# Patient Record
Sex: FEMALE | Race: WHITE | NOT HISPANIC OR LATINO | Employment: OTHER | URBAN - METROPOLITAN AREA
[De-identification: names, ages, dates, MRNs, and addresses within clinical notes are randomized per-mention and may not be internally consistent; named-entity substitution may affect disease eponyms.]

---

## 2023-12-07 DIAGNOSIS — R42 DIZZINESS: Primary | ICD-10-CM

## 2023-12-07 RX ORDER — NADOLOL 40 MG/1
40 TABLET ORAL 2 TIMES DAILY
COMMUNITY
Start: 2013-03-10 | End: 2023-12-07 | Stop reason: SDUPTHER

## 2023-12-07 RX ORDER — NADOLOL 40 MG/1
40 TABLET ORAL 2 TIMES DAILY
Qty: 60 TABLET | Refills: 3 | Status: SHIPPED | OUTPATIENT
Start: 2023-12-07 | End: 2024-02-14 | Stop reason: DRUGHIGH

## 2023-12-07 NOTE — TELEPHONE ENCOUNTER
Received message from pharmacy that script was transferred to them, all refills have been dispensed and they are in need of a new script for pt. Pt was last seen 3/28/23 with instructions to F/U in 6-12 months. Pt does not have F/U becky scheduled at this time.

## 2024-02-14 ENCOUNTER — TELEMEDICINE (OUTPATIENT)
Dept: NEUROLOGY | Facility: CLINIC | Age: 59
End: 2024-02-14
Payer: MEDICARE

## 2024-02-14 DIAGNOSIS — G90.A POSTURAL ORTHOSTATIC TACHYCARDIA SYNDROME: ICD-10-CM

## 2024-02-14 DIAGNOSIS — G43.719 INTRACTABLE CHRONIC MIGRAINE WITHOUT AURA AND WITHOUT STATUS MIGRAINOSUS: Primary | ICD-10-CM

## 2024-02-14 PROCEDURE — 99214 OFFICE O/P EST MOD 30 MIN: CPT | Performed by: NURSE PRACTITIONER

## 2024-02-14 RX ORDER — ELETRIPTAN HYDROBROMIDE 40 MG/1
40 TABLET, FILM COATED ORAL AS NEEDED
COMMUNITY
Start: 2010-10-28 | End: 2024-02-14 | Stop reason: SDUPTHER

## 2024-02-14 RX ORDER — ONDANSETRON 8 MG/1
8 TABLET, ORALLY DISINTEGRATING ORAL EVERY 8 HOURS PRN
COMMUNITY
Start: 2016-07-06 | End: 2024-02-14 | Stop reason: SDUPTHER

## 2024-02-14 RX ORDER — ONDANSETRON 8 MG/1
8 TABLET, ORALLY DISINTEGRATING ORAL EVERY 8 HOURS PRN
Qty: 20 TABLET | Refills: 5 | Status: SHIPPED | OUTPATIENT
Start: 2024-02-14

## 2024-02-14 RX ORDER — NADOLOL 80 MG/1
80 TABLET ORAL 2 TIMES DAILY
Qty: 60 TABLET | Refills: 1 | Status: SHIPPED | OUTPATIENT
Start: 2024-02-14 | End: 2024-04-26

## 2024-02-14 RX ORDER — ELETRIPTAN HYDROBROMIDE 40 MG/1
40 TABLET, FILM COATED ORAL AS NEEDED
Qty: 6 TABLET | Refills: 5 | Status: SHIPPED | OUTPATIENT
Start: 2024-02-14

## 2024-02-14 RX ORDER — TIZANIDINE 4 MG/1
4 TABLET ORAL 3 TIMES DAILY PRN
COMMUNITY
Start: 2019-03-09 | End: 2024-02-14 | Stop reason: SDUPTHER

## 2024-02-14 RX ORDER — TIZANIDINE 4 MG/1
4 TABLET ORAL 3 TIMES DAILY PRN
Qty: 90 TABLET | Refills: 1 | Status: SHIPPED | OUTPATIENT
Start: 2024-02-14

## 2024-02-14 NOTE — PROGRESS NOTES
Patient being assessed today for follow-up of migraine and POTS.  Patient has been having increased migraine activity over the last several months.  She does utilize the Relpax which will at least relieve and ease up some of the pain even if it does not completely abort it, she is able to manage the pain better.  She has also had 3 episodes of syncope.  Would like to increase her nadolol titrating up to 80 mg twice daily.  Verbally explained that to patient and spouse who both verbalized understanding.  We will continue the Relpax, tizanidine, Zofran as she has been taking them.  Patient has pending hip replacement surgery.  Patient also reporting that she has had some significant challenges with her memory.  She did follow with her PCP who ordered neurocognitive evaluation.  Discussed role of medicine, importance of taking medications, potential risks, benefits, and precautions to be taken.  Reviewed sleep hygiene and dietary modifications.  Follow-up in 4 to 6 weeks.    This note was created with voice recognition software and was not corrected for typographical or grammatical errors

## 2024-02-21 ENCOUNTER — HOSPITAL ENCOUNTER (OUTPATIENT)
Facility: HOSPITAL | Age: 59
Setting detail: OUTPATIENT SURGERY
End: 2024-02-21
Attending: STUDENT IN AN ORGANIZED HEALTH CARE EDUCATION/TRAINING PROGRAM | Admitting: STUDENT IN AN ORGANIZED HEALTH CARE EDUCATION/TRAINING PROGRAM
Payer: MEDICARE

## 2024-02-27 ENCOUNTER — OFFICE VISIT (OUTPATIENT)
Dept: PSYCHOLOGY | Facility: CLINIC | Age: 59
End: 2024-02-27
Payer: MEDICARE

## 2024-02-27 DIAGNOSIS — R41.9 COGNITIVE COMPLAINTS: ICD-10-CM

## 2024-02-27 DIAGNOSIS — R41.3 MEMORY DIFFICULTY: Primary | ICD-10-CM

## 2024-02-27 PROCEDURE — 99211NT NEUROPYSCH TESTING PENDING FINAL BILLING: Performed by: PSYCHOLOGIST

## 2024-02-27 NOTE — PROGRESS NOTES
Neuropsychology Evaluation    Name: Yumi Tai  : 1965  MRN: [id number]  Referring Provider: Lisandra Mccoy MD  Date of Service: 2024    Reason for Visit: Ms. Tai was referred for neuropsychological evaluation due to memory difficulty and cognitive complaints.    In the context of presumed average premorbid intellectual functioning, present results demonstrated largely intact cognitive abilities in all domains tested (i.e., simple auditory attention, working memory, processing speed, visuospatial abilities, language, learning and memory, executive functioning), with relative difficulty limited to tests involving verbal speed (i.e., verbal fluency, speeded word reading/color naming). Fine motor dexterity was largely within normal limits bilaterally (slightly worse on the L, though not a significant discrepancy), and there was no other indication of lateralized dysfunction. Most notably, Ms. Tai indicated severely elevated depressive and anxiety symptoms on self-report measures, consistent with the significant distress described on interview.     DIAGNOSTIC IMPRESSIONS: History and present results do not indicate a dementia or even lesser form of cognitive impairment.     Relative difficulty on select tests involving verbal speed, in the context of an otherwise normal cognitive profile (including average performances on other measures of language and processing speed), does not warrant a cognitive diagnosis at this time. Rather, this profile most likely reflects longstanding weaknesses (e.g., consistent with past diagnosis of reading disorder), as well as potential interference from psychological factors (e.g., the considerable anxiety observed during this evaluation) on test performance. Ms. Tai's reported difficulties with memory were not apparent on testing; in fact, learning and memory were relative strengths.     In her daily life, stress, depression, anxiety, experience  of pain/somatic discomfort, lack of restorative sleep, and daytime fatigue are likely contributing to her experience of cognitive difficulty, and the overall clinical picture suggests there is likely a functional component to symptoms. Even minimally, these factors may intermittently reduce cognitive effectiveness, typically by depleting cognitive/mental resources and interfering with normal attention and efficiency of information processing (e.g., via intrusive negative, self-critical, and/or anxious thoughts). With attention disturbance, memories are poor due to a failure to encode information versus a loss of learned material. Stress has also been shown to impair the retrieval of stored information. In particular, Ms. Tai's description of cognitive difficulties in daily life seem like lapses in attention and diminished cognitive effectiveness. Psychological/constitutional factors can also compromise other abilities (e.g., performing complex detail-oriented tasks, attending to multiple tasks at once, acquiring new information, and organizing thoughts/speech) and hinder the ability to adapt to, or develop compensatory strategies for, any subtle cognitive weaknesses/changes that may be present.     RECOMMENDATIONS & CONSIDERATIONS:    1.   Ms. Tai can be assured that her cognitive functioning is largely normal compared to same-aged peers. Importantly, learning/memory performances were consistently within normal limits, indicating that she does not currently have a fundamental memory retention problem. Rather, stress, anxiety, depression, fluctuating mood, experience of chronic pain, lack of refreshing/non-restorative sleep, and daytime fatigue are likely interfering with the ability to effectively encode and retrieve information in her day-to-day life.     Ms. Tai should continue to follow up with Neurology for optimal management of migraines and POTS.     2.   The severity of Ms. Tai's  reported depression, anxiety, and stress reaction suggests that psychological symptoms are not optimally managed at present; aggressively targeting these factors would be prudent.    Ms. Tai would likely benefit from consultation with a psychiatrist to better assist with symptom management. Dr. Loren Mullins, Dr. Joyce Leo, Dr. Mayur Oconnor, Dr. Kathia Reyez, or one of their colleagues in Psychiatry ([phone]) can assist with optimizing pharmacological management of emotional factors that might be exacerbating cognitive difficulties and/or otherwise interfering with everyday functioning. [place]. Farheen Mcginnis [place] [place] [phone][place] and Dr. Lin Weir MD ([phone]) are both highly recommended.    Ms. Tai would likely also benefit from individual psychotherapy / adjustment counseling, particularly brief structured cognitive behavioral therapy (CBT) or acceptance and commitment therapy (ACT), to learn effective coping skills and strategies for managing stress, mood, and physical symptoms that interfere with her daily functioning. Alternatively, given her emphasis on somatic and cognitive symptoms, she may consider adjustment counseling with a health psychologist. Health psychologists specialize in the interaction between physical wellbeing and mental health, and may be particularly well-equipped to assist Ms. Tai in learning adaptive strategies of coping with physical symptoms/difficulties and other life stressors that can affect coping ability.    3.   Restorative sleep (7-9 hours/night recommended for adults) is critical for daytime alertness/safety, cognitive effectiveness, and mood, as well as interpersonal relationships and physical, neurological, and mental health more broadly. Ms. Tai described a degree of sleep disturbance (related, at least partly, to psychiatric factors), with diagnosis of insomnia, and daytime  fatigue; if difficulties persist or worsen after better management of psychological symptoms, she is encouraged to continue working with Sleep Medicine and her other providers to optimize sleep. Along these lines, she may benefit from Cognitive Behavioral Therapy for Insomnia (CBT-I), as recommended by Sleep Medicine. Dr. Jessie Canela ([phone]) specializes in behavioral interventions that can enhance sleep and could be helpful in in this regard.    More broadly, the following behavioral strategies and environmental modifications can help optimize sleep duration and quality:            a.  Set a fixed bedtime and waking time every day.            b.  Avoid napping during the day.             c.  Avoid alcohol, caffeine and heavy, spicy, or sugary foods 4-6 hours before bedtime.             d.  Exercise regularly, but not right before going to bed.            e.  Block out all distractions by turning off - or even removing from the room - electronic devices such as TV, computer, phone, video player, audio player, mikayla device, etc.            f.  Use comfortable bedding, set a comfortable temperature, and keep the room well ventilated.            g.  Do not use the bed as an office, workroom, or recreation room.             h.  Establish a pre-sleep ritual, such as a warm bath or a few minutes of reading.            i.  If prone to anxiety, relaxation techniques such as yoga and deep breathing can be helpful.     4.   In general, information is more likely to be reliably remembered when efforts are made to explicitly enhance initial learning, such as by rehearsing, paraphrasing, and restating information; and by identifying links between to-be learned information and prior knowledge.     Structural and organizational reinforcements are often helpful for individuals with memory complaints. As such, Ms. Tai may benefit from the following strategies:            a.  Following a routine and consistent daily  schedule.             b.  Using a single planner, calendar, or electronic organizer to plan and manage appointment dates, activities, and tasks.            c.  Working on one task at a time until it is completed.            d.  Keeping a single list of tasks, listed by priority, and marking them when complete.            e.  Placing reminders in places where they are easily noticed (e.g., a note on door).            f.  Using a mobile phone or electronic calendar to set timers for tasks and events (e.g., medications, appointments).            g.  Writing down important information (e.g., tasks, conversational details, list items) immediately, to strengthen encoding and for later reference. This will also help to free up cognitive resources to focus on the task at hand.            h.  Associating new information with older, previously stored information (e.g., familiar categories, anecdotes, references, reminders).            i.  Using recognition cue cards in everyday memory situations that come up with frequency, such as lists of routine household tasks, errands, or grocery items to prompt recall.            j.   Having specific locations for frequently used items (e.g., keys, wallet, phone).    5.   Ms. Tai is encouraged to remain in close contact with medical professionals regarding her cerebrovascular risk factors (e.g., hyperlipidemia, diabetes, obesity), POTS, and hypothyroidism, as these conditions may affect current and future brain function.    The following activities and interventions are recommended for optimizing brain health and preserving cognitive function:            a.  “heart-healthy”/cardiovascular diet;            b.  good stress management (e.g., relaxation techniques);            c.  management of any vascular risks (e.g., hypertension, cholesterol);            d.  30-60 minutes of daily aerobic exercise (e.g., walking, swimming);            e.  social engagement (e.g., getting together  "with other people);             f.  adequate sleep; and             g.  cognitively stimulating activities (e.g., classes to learn new things, challenging puzzles).      6.   A planned re-evaluation does not appear necessary at this time. If concerns persist or worsen after psychological factors are adequately managed, updated neuropsychological evaluation, as clinically indicated (no sooner than 1-2 years), using present data as baseline, may be helpful in determining if there is cognitive change over time. Re-evaluation without first addressing the above factors would be of limited benefit, given the degree to which psychological/sleep difficulties likely interfere with her functional status.      Thank you for the opportunity to participate in Ms. Tai's evaluation and care. If we can be of further assistance, please do not hesitate to contact us at [phone].      -- EXTENDED REPORT --    History of Presenting Illness: Ms. Tai is a 58-year-old, right-handed,  female, followed by [facility] for neurological management of chronic migraines (with limited benefit from medications) and postural orthostatic tachycardia syndrome (POTS). History additionally significant for fibromyalgia, and records also note other somatic concerns, including dizziness and fatigue.    SELECT RELEVANT LABS/EXAMS: CTA of the head and neck (3/29/2012) was read by the radiologist as showing “focal area of stenosis versus artifact [near] the M2/M3 junction of the left middle cerebral artery”; “possible stenosis is also identified at the origin of the right posterior cerebral artery.” MRA was recommended for further evaluation.    MRI of the brain (with/without contrast; 9/25/2014) was read as showing \"no evidence of acute infarction or other acute pathology” and “probable remote right pontine ischemic focus.\" MRV of the head was “unremarkable, without evidence of thrombosis.”    MRA of the head " "(9/25/2014) was read as \"unremarkable, without evidence of thrombosis.\"    Ms. Tai is unemployed, with Social Security disability status (related to migraines and fibromyalgia) since 2006. Regarding instrumental activities of daily living (IADLs):      - Medication management: Independent, using a pill organizer. Ms. Tai acknowledged occasional difficulty with insulin management (e.g., unsure if she already administered a dose, or if dosing was correct); she was previously regimented in documenting this information until 2020, after initiation of subcutaneous pump.   - Finances / bill paying:  assumed responsibility in 2006 (after the patient's pulmonary embolism), due to her anxiety in the context of financial constraints/stress and her unemployment at the time.    - Scheduling / appointments: Independent (with routine use of a calendar), without reported problems/difficulty.   - Driving: Limited by increased anxiety; denied any recent accidents/near misses, serious traffic violations, navigation difficulty, or instances of getting lost.    - Cooking / meal preparation: Usually done together with , without significant difficulty (aside from physical limitations; uses lists for more involved meals). No safety incidents or concerns.   - Household tasks: Completes light household chores without difficulty; otherwise, limited by physical difficulty/somatic symptoms.   - Ms. Tai is fully independent with more basic ADLs (e.g., bathing, toileting, hygiene, dressing, eating) but described diminished self-care/hygiene (e.g., reduced motivation to shower) due to physical difficulties and fatigue.     Relevant Medical Status & History:    - Sleep: Typically in bed around 8:30-9:30PM (with occasional difficulty falling asleep, despite nightly use of medical marijuana). Sleep is often restless, and Ms. Tai wakes at least 1x/night, without difficulty returning to sleep. She does not " feel particularly well-rested in the morning and reported significant daytime fatigue (“tired all the time”), with tendency to nap/doze. Past sleep study was indicative of insomnia.    - Physical activity/exercise: Playing with the grandchildren.   - Alcohol use: Rare/minimal (i.e., an occasional spritzer); no reported history of particularly heavy or problematic alcohol use.   - Tobacco: Denied past and present use.   - Other substances: Medical marijuana card since May 2023, with nightly use of 10mg edible for sleep/anxiety and occasional use of 2.5mg for social anxiety.   - Caffeine: None/minimal.     - Stroke/TIA: Denied; the patient reported an incident in ~2011/2012 (no ED was found in the medical record) when she had a migraine with facial numbness and slurred speech, prompting admittance to the ED; a TIA was reportedly suspected but symptoms were attributed to complex migraine (records were not available for review).    - Seizures: Denied.    - Head injury/concussion (with loss of consciousness): Denied.    - POTS: Records indicate autonomic and tilt table test was completed in 2016 with Dr. Forbes showed prominent orthostatic intolerance with compensatory tachycardia (record not available for review), prompting diagnosis of POTS. Records from Dr. Hicks of Cardiology (see note dated 4/6/2021) indicate no postural symptoms and anxiety was noted to be a confounding factor. During interview, she reported episodes of dizziness/syncope ~4x/year; currently followed by [facility].    - Headaches/Migraines: Longstanding history of subtle headaches (typically around menses) since childhood. Worsened with onset of migraines post-hysterectomy in 2008. She denied noted being trialed on several different medication (with minimal benefit; followed by [facility]), noting having 9 migraines and 14 headaches over the course of the month of January.   - Cardiology: EKG completed on 03/22/2021 was read as showing  "\"left ventricular systolic function is normal with a 60-65% estimated ejection fraction\" and \"spectral Doppler shows an impaired relaxation pattern of left ventricular diastolic filling.\" She was briefly followed by Dr. Hicks of  Cardiology (last appointment dated 04/06/2021); hypertension is currently managed with medications, monitoring by PCP.     - [place] Palsy: Onset in teens; resolved within 1 year without known treatment.    - Motor/gait: Reduced in the context of fibromyalgia. Ambulates with the use of a cane/walker.    - Metabolic: Adult onset diagnosed in 2006; managed with insulin pump; followed by Dr. Ruvalcaba.    - Endocrine: Lichen planus (vagus) and is prone to frequent UTIs. Urinary/bowel incontinence was also noted.    - Pain: Back and knees; typically 5/10 (with 10 being most severe); reportedly 3/10 during the current evaluation.     Patient Active Problem List   Diagnosis    Headache, chronic migraine without aura, intractable    Postural orthostatic tachycardia syndrome     Past Medical History:   Diagnosis Date    Cervicalgia 04/09/2014    Cervicalgia    Generalized hyperhidrosis 04/27/2015    Generalized hyperhidrosis    Other disturbances of skin sensation 04/09/2014    Burning sensation    Personal history of pulmonary embolism     History of pulmonary embolism     Psychiatric Status & History: Ms. Tai reported longstanding mild depression (onset during childhood/adolescence, in the context of difficult upbringing; e.g., father passed away when she was 2 years old). Depression and anxiety were noted to worsen in the context of her first marriage, her son's  deployment in 2005, and onset of health-related issues in 2006; symptoms have worsened significant over the past year. She described a pattern of racing thoughts/rumination when waking in the morning and feeling compelled to complete tasks on her lists. Increased isolation, reduced social engagement, and anhedonia was " also described. She endorsed passive thoughts about death but denied any history/current plan or means to act; protective factors included family.    - Treatment history: Engaged in marital therapy in ~2001. Reportedly diagnosed and treated for PTSD in the context of her son's  service/deployment and [facility] (without perceived benefit). No history of psychiatric consultation; psychiatric medication (Xanax) is managed by PCP. She is not currently engaged in therapy/counseling.   - Recent mood:  On a scale from 0 (none) to 10 (most severe), recent depression was rated a 10; anxiety was rated 10.    - Recent stressors: Health-related concerns; migraines/headaches.    - Hobbies: Previously enjoying crafting but stopped due to physical pain/fatigue.    - Social support: Primarily  and sister.     Developmental/Educational/Psychosocial History:    - Early development: Reportedly normal; no known birth complications or developmental delays.   - Academic history: Noted potentially repeating 1st or 2nd grade due to the families frequently relocating to different states/schools. Reported a history of learning disorder (reading) diagnosed in middle school and required specialized courses throughout high school.   - Educational attainment: 12 years.   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    - Social history: This is Ms. Tai's second marriage (previously ); she has 3 children from her first marriage and 8 grandchildren.   - Currently lives with:   (whom she has known since age 6).    Relevant Family History: Migraines (sister, mother, niece); Parkinson's disease (maternal aunt). Family medical history largely unknown for paternal side.     Procedures/Tests:  Review of available records;  Adult Neuropsychology History Form; Clinical interview with Ms. Tai and her  conjointly;  Jacobsen Anxiety Inventory (ARABELLA)  Jacobsen Depression Inventory (BDI)  Brief Visuospatial Memory Test-Revised (BVMT-R)  California Verbal Learning Test-Second Edition (CVLT-II) - Short Form   Bonnie-Lee Executive Function System (D-KEFS) Verbal Fluency  Dot Counting Test (DCT)  Grooved Pegboard Test  Neuropsychological Assessment Battery (NAB) Naming Test  Stroop Color and Word Test (Stroop)  Trail Making Test (TMT)  Wechsler Adult Intelligence Scale-Fourth Edition (WAIS-IV) Digit Span, Coding, and Matrix Reasoning subtests  Wechsler Test of Adult Reading (WTAR)    The purpose of this evaluation was reviewed, as were issues related to confidentiality and options for receiving feedback regarding results. All questions were answered; Ms. Tai verbalized understanding and agreed to proceed with this evaluation.    Note: All testing was administered by a psychometrist; results were interpreted in the context of the appropriate normative data. Additional tests were planned, but not administered, due to Ms. Tai's apparent level of anxiety during the evaluation (and its potential impact on test performance/task engagement).    MENTAL STATUS & BEHAVIORAL EXAM: Ms. Tai arrived for the appointment on time and accompanied by her , Misha. She was casually dressed and appropriately groomed. She ambulated with a cane; gait was slow and mildly unsteady. There were no apparent involuntary motor movements. Ms. Tai was alert and fully oriented to self, situation, place, and time. She wore glasses; vision was reportedly adequate for testing. Hearing and receptive language appeared intact on informal observation. Conversational speech was generally fluent and normal in rate, prosody, and volume, with sporadic/inconsistent stutter (i.e., during tests requiring speeded verbal output; on interview, when  discussing her difficulties with verbal communication). There were no notable language abnormalities. Ms. Tai was open and responsive to questions, detailed recall of autobiographical and clinical information/events; she often looked to her  for assistance/reassurance, though her recall seemed more accurate (he generally deferred to her if there was a discrepancy between their reports). Organizing her thoughts appeared somewhat effortful, though expressed thoughts were logical and goal-directed. Affect was full and somewhat dysphoric, with significant apparent anxiety and increasing emotional distress as the evaluation progressed. At one point, she reported feeling dizzy, which improved with a snack/break. Nevertheless, Ms. Tai was cooperative in completing evaluation procedures and appeared to give her best effort on tasks.     Results/Data:       Descriptor T-Score Standard Score Z-Score Scaled Score %ile Rank   Very Superior > 70 > 130 > 2.00  > 16 > 98   Superior 63-69 120-129 1.34 to 1.99 14-15 91-97   High Average 57-62 110-119 0.67 to 1.33 12-13 75-90   Average 43-56  -0.66 to 0.66 8-11 25-74   Low Average 37-42 80-89 -1.33 to -0.67 6-7 9-24   Borderline 33-36 75-79 -1.67 to -1.34 5 5-8   Mildly Deficient 30-32 70-74 -2.00 to -1.68 4 2-4   Moderately Deficient 25-29 62-69  -2.53 to -2.01 3 1   Severely Deficient <24 <61 <-2.54 1-2 <1      Performance Validity: Results of stand-alone and embedded measures of performance validity were \within valid ranges.     Premorbid Functioning: Based on a measure of sight-reading ability, premorbid intellectual functioning was estimated to be in the average range (estimated FSIQ = 95). On a different (non-verbal) measure shown to be a good indicator of premorbid abilities, performance was average.    Attention & Working Memory: Span of auditory attention/working memory, measured by repetition of digits, was average overall, with average digits  forward, backward, and in sequence.    Processing Speed & Executive Abilities: Efficiency in matching and reproducing symbols associated with numbers was average. Speeded word reading was severely deficient, and color naming was mildly deficient; color naming of incongruent color words (e.g., blue ink spelling the word “red”) was low average.    Performance on a timed task that requires simultaneous visual scanning, number sequencing, and fine-motor coordination was average, with 0 errors. When the task became more complex, requiring alternating attention to sequencing numbers and letters, performance was average, with 0 errors. A comparison of scores on the two tasks did not suggest a problem with shifting of mental set.    Learning & Memory: Learning and recall of a 9-item word list over five trials was low average, with a strong/above-average learning slope (trial scores: 4, 6, 8, 8). Use of semantic clustering strategy during list learning was high average. After a short delay, Ms. Tai was able to recall all 9 list words (very superior performance). After a longer, free recall was average (7 list words); cued recall was average (8 list words). Delayed recognition was average (8/9), with 0 false-positive errors; ability to distinguish list words from non-list foils was average.     Regarding visuospatial memory, Ms. Sarkars ability to learn and recall an array of six geometric designs over three trials was in the high-average range, with an average learning curve. Later recall was high average (100% of best learning trial), and recognition of the designs was without error.     Language Functioning: Naming of common objects was average (31/31). Letter fluency (i.e., rapid generation of words beginning with a given letter over three one-minute trials) and category fluency (i.e., rapid generation of words in a specific category) were low average. Category fluency while switching back and forth between  two categories was average. Across verbal fluency tasks, number of repetitions and set-loss errors were within normal limits.     Visuospatial Functioning: Ability to copy an array of two-dimensional geometric designs was intact.    Motor Functioning:  Fine motor dexterity, measured by rapid placement of grooved pegs, was average for the dominant (right) hand and low average for the non-dominant (left).     Emotional Functioning: On face-valid self-report inventories assessing mood and emotional status, Ms. Tai's responses suggested severe depression and severe anxiety.      Electronically signed by Samara Kinsey PsyD at 7/21/2024    6:21 PM      Attestation: I saw and evaluated the patient. I personally obtained the key and critical portions of the neuropsychological exam or was physically present for key and critical portions performed by the fellow. I consulted with the fellow, reviewed/revised all documentation, and agree with the diagnostic impressions and neuropsychological findings.

## 2024-02-28 RX ORDER — DULOXETIN HYDROCHLORIDE 60 MG/1
60 CAPSULE, DELAYED RELEASE ORAL DAILY
COMMUNITY

## 2024-02-28 RX ORDER — METFORMIN HYDROCHLORIDE 500 MG/1
1000 TABLET, EXTENDED RELEASE ORAL 2 TIMES DAILY
COMMUNITY
Start: 2023-10-15

## 2024-02-28 RX ORDER — FENOFIBRATE 145 MG/1
145 TABLET, FILM COATED ORAL DAILY
COMMUNITY
Start: 2020-04-02

## 2024-02-28 RX ORDER — HUMAN INSULIN 100 [IU]/ML
10 INJECTION, SUSPENSION SUBCUTANEOUS
COMMUNITY
Start: 2013-03-22

## 2024-02-28 RX ORDER — ERGOCALCIFEROL 1.25 MG/1
50000 CAPSULE ORAL
COMMUNITY

## 2024-02-28 RX ORDER — ONDANSETRON HYDROCHLORIDE 8 MG/1
8 TABLET, FILM COATED ORAL ONCE
COMMUNITY

## 2024-02-28 RX ORDER — ATORVASTATIN CALCIUM 10 MG/1
10 TABLET, FILM COATED ORAL DAILY
COMMUNITY
Start: 2020-04-02

## 2024-02-28 RX ORDER — VIT C/E/ZN/COPPR/LUTEIN/ZEAXAN 250MG-90MG
1200 CAPSULE ORAL DAILY
COMMUNITY

## 2024-02-28 RX ORDER — ALPRAZOLAM 1 MG/1
1 TABLET ORAL NIGHTLY PRN
COMMUNITY

## 2024-02-28 RX ORDER — VALACYCLOVIR HYDROCHLORIDE 500 MG/1
500 TABLET, FILM COATED ORAL DAILY
COMMUNITY
Start: 2021-08-16

## 2024-02-28 RX ORDER — LEVOTHYROXINE SODIUM 50 UG/1
50 CAPSULE ORAL DAILY
COMMUNITY

## 2024-02-28 RX ORDER — GABAPENTIN 300 MG/1
300 CAPSULE ORAL 2 TIMES DAILY
COMMUNITY
Start: 2015-04-14

## 2024-03-13 ENCOUNTER — OFFICE VISIT (OUTPATIENT)
Dept: PSYCHOLOGY | Facility: CLINIC | Age: 59
End: 2024-03-13
Payer: MEDICARE

## 2024-03-13 DIAGNOSIS — R41.9 COGNITIVE COMPLAINTS WITH NORMAL NEUROPSYCHOLOGICAL EXAM: ICD-10-CM

## 2024-03-13 DIAGNOSIS — F54 PSYCHOLOGICAL FACTORS AFFECTING MEDICAL CONDITION: ICD-10-CM

## 2024-03-13 DIAGNOSIS — F43.0 STRESS REACTION: ICD-10-CM

## 2024-03-13 DIAGNOSIS — F41.9 ANXIETY: ICD-10-CM

## 2024-03-13 DIAGNOSIS — R41.3 MEMORY DIFFICULTY: Primary | ICD-10-CM

## 2024-03-13 DIAGNOSIS — F32.A DEPRESSION, UNSPECIFIED DEPRESSION TYPE: ICD-10-CM

## 2024-03-13 PROCEDURE — 96132 NRPSYC TST EVAL PHYS/QHP 1ST: CPT | Performed by: PSYCHOLOGIST

## 2024-03-13 PROCEDURE — 96138 PSYCL/NRPSYC TECH 1ST: CPT | Performed by: PSYCHOLOGIST

## 2024-03-13 PROCEDURE — 96116 NUBHVL XM PHYS/QHP 1ST HR: CPT | Performed by: PSYCHOLOGIST

## 2024-03-13 PROCEDURE — 96133 NRPSYC TST EVAL PHYS/QHP EA: CPT | Performed by: PSYCHOLOGIST

## 2024-03-13 PROCEDURE — 96139 PSYCL/NRPSYC TST TECH EA: CPT | Performed by: PSYCHOLOGIST

## 2024-03-13 NOTE — PROGRESS NOTES
Neuropsychology Note    Name: Yumi Tai  : 1965  MRN: [id number]      Date of Service: 2024     Reason For Visit: Ms. Tai underwent neuropsychological evaluation on 2024 due to memory difficulty and cognitive complaints. The full report can be found in the note for that visit.     She returned today for feedback regarding evaluation results.    Summary: Ms. Tai was seen today to discuss findings from her neuropsychological evaluation on 2024; she was accompanied by her .    Results were reviewed and explained, with a thorough discussion of clinical impressions. It was explained that findings are not indicative of cognitive impairment. Rather, the neuropsychological profile (i.e., with relative difficulty limited to select tests involving verbal speed, in the context of otherwise largely average/above-average performances) is most consistent with her reported history of reading disorder (diagnosed in middle school), together with intermittent interference from non-neurological factors (e.g., stress, anxiety, depression, fatigue) on test performance. In fact, the high level of anxiety/emotional distress observed during the evaluation had prompted a decision to pare down the test battery that day, given the likelihood of psychological symptoms influencing results.    Psychoeducation was provided regarding the effect of stress, anxiety, depression, and fatigue/lack of restorative sleep on cognitive effectiveness. Recommendations were discussed, and questions were addressed.    Ms. Tai was actively engaged in the appointment; affect was generally anxious and somewhat dysphoric, with appropriate brightening. She expressed relief that there was no indication of significant cognitive decline/impairment or a neurodegenerative process. She and her  verbalized understanding of findings, impressions, and recommendations.

## 2024-04-25 DIAGNOSIS — G43.719 INTRACTABLE CHRONIC MIGRAINE WITHOUT AURA AND WITHOUT STATUS MIGRAINOSUS: ICD-10-CM

## 2024-04-25 DIAGNOSIS — G90.A POSTURAL ORTHOSTATIC TACHYCARDIA SYNDROME: ICD-10-CM

## 2024-04-26 RX ORDER — NADOLOL 80 MG/1
80 TABLET ORAL 2 TIMES DAILY
Qty: 60 TABLET | Refills: 0 | Status: SHIPPED | OUTPATIENT
Start: 2024-04-26 | End: 2024-05-06 | Stop reason: SDUPTHER

## 2024-04-30 ENCOUNTER — HOSPITAL ENCOUNTER (OUTPATIENT)
Dept: RADIOLOGY | Facility: HOSPITAL | Age: 59
Discharge: HOME | End: 2024-04-30
Payer: MEDICARE

## 2024-04-30 DIAGNOSIS — M16.12 PRIMARY OSTEOARTHRITIS OF LEFT HIP: ICD-10-CM

## 2024-04-30 PROCEDURE — 73700 CT LOWER EXTREMITY W/O DYE: CPT | Mod: LT

## 2024-05-06 ENCOUNTER — APPOINTMENT (OUTPATIENT)
Dept: PREADMISSION TESTING | Facility: HOSPITAL | Age: 59
End: 2024-05-06
Payer: MEDICARE

## 2024-05-06 ENCOUNTER — TELEMEDICINE (OUTPATIENT)
Dept: NEUROLOGY | Facility: CLINIC | Age: 59
End: 2024-05-06
Payer: MEDICARE

## 2024-05-06 DIAGNOSIS — G43.719 INTRACTABLE CHRONIC MIGRAINE WITHOUT AURA AND WITHOUT STATUS MIGRAINOSUS: ICD-10-CM

## 2024-05-06 DIAGNOSIS — G90.A POSTURAL ORTHOSTATIC TACHYCARDIA SYNDROME: ICD-10-CM

## 2024-05-06 PROCEDURE — 99214 OFFICE O/P EST MOD 30 MIN: CPT | Performed by: NURSE PRACTITIONER

## 2024-05-06 RX ORDER — NADOLOL 80 MG/1
80 TABLET ORAL 2 TIMES DAILY
Qty: 180 TABLET | Refills: 1 | Status: SHIPPED | OUTPATIENT
Start: 2024-05-06

## 2024-05-06 RX ORDER — CLONIDINE HYDROCHLORIDE 0.1 MG/1
0.1 TABLET ORAL DAILY
Qty: 30 TABLET | Refills: 1 | Status: SHIPPED | OUTPATIENT
Start: 2024-05-06

## 2024-05-06 NOTE — PROGRESS NOTES
Patient being assessed today for follow-up on migraines and POTS.  She reports that her symptoms have been improved significantly since we adjusted and increased the nadolol dose.  She has only had 3 migraine episodes in a couple of regular headaches since we did that.  She is reporting significant Hydra pheresis and in the past she has been on clonidine which did help that.  Will put her on clonidine 0.1 mg daily to see if that helps.  Continue the nadolol as she has been taking it.  Discussed role of medicine,  importance of taking medications, potential risks, benefits, and precautions to be taken.  Reviewed sleep hygiene and dietary modifications.  Patient to notify office in 4 to 6 weeks of efficacy.  Follow-up in 6 months.    This note was created with voice recognition software and was not corrected for typographical or grammatical errors

## 2024-05-18 ENCOUNTER — OFFICE VISIT (OUTPATIENT)
Dept: URGENT CARE | Facility: CLINIC | Age: 59
End: 2024-05-18
Payer: MEDICARE

## 2024-05-18 VITALS
RESPIRATION RATE: 20 BRPM | HEART RATE: 79 BPM | BODY MASS INDEX: 41.78 KG/M2 | OXYGEN SATURATION: 97 % | TEMPERATURE: 97 F | HEIGHT: 66 IN | SYSTOLIC BLOOD PRESSURE: 158 MMHG | WEIGHT: 260 LBS | DIASTOLIC BLOOD PRESSURE: 91 MMHG

## 2024-05-18 DIAGNOSIS — J40 SINOBRONCHITIS: Primary | ICD-10-CM

## 2024-05-18 DIAGNOSIS — J32.9 SINOBRONCHITIS: Primary | ICD-10-CM

## 2024-05-18 PROCEDURE — 99203 OFFICE O/P NEW LOW 30 MIN: CPT | Performed by: PHYSICIAN ASSISTANT

## 2024-05-18 PROCEDURE — 1036F TOBACCO NON-USER: CPT | Performed by: PHYSICIAN ASSISTANT

## 2024-05-18 RX ORDER — DOXYCYCLINE 100 MG/1
100 CAPSULE ORAL 2 TIMES DAILY
Qty: 20 CAPSULE | Refills: 0 | Status: SHIPPED | OUTPATIENT
Start: 2024-05-18 | End: 2024-05-28

## 2024-05-18 ASSESSMENT — ENCOUNTER SYMPTOMS
EYES NEGATIVE: 1
ALLERGIC/IMMUNOLOGIC NEGATIVE: 1
FEVER: 0
COUGH: 1
CARDIOVASCULAR NEGATIVE: 1
GASTROINTESTINAL NEGATIVE: 1
MUSCULOSKELETAL NEGATIVE: 1
HEMATOLOGIC/LYMPHATIC NEGATIVE: 1
SINUS COMPLAINT: 1
SORE THROAT: 1
NEUROLOGICAL NEGATIVE: 1
SHORTNESS OF BREATH: 0
PSYCHIATRIC NEGATIVE: 1
ENDOCRINE NEGATIVE: 1

## 2024-05-18 ASSESSMENT — PAIN SCALES - GENERAL: PAINLEVEL: 0-NO PAIN

## 2024-05-18 NOTE — PATIENT INSTRUCTIONS
Increase clear fluids  OTC cough med as directed  Pcp follow up this week if not improving or worsening  ER visit anytime 24/7 for acute worsening or changing condition

## 2024-05-18 NOTE — PROGRESS NOTES
"Subjective   Patient ID: Yumi Tai is a 58 y.o. female.      URI  Presenting symptoms: congestion, cough, ear pain and sore throat    Presenting symptoms: no fever    Cough  Associated symptoms include ear pain and a sore throat. Pertinent negatives include no fever or shortness of breath.   Sinus Problem  Associated symptoms: congestion, cough, ear pain and sore throat    Associated symptoms: no fever and no shortness of breath    This is a 58 yr old female here for respiratory sxs. Sore throat, dry cough, clear/green nasal congestion, chest congestion and right ear pain x 1 week. No dyspnea, or fever. Home COVID test negative x 2.     Review of Systems   Constitutional:  Negative for fever.   HENT:  Positive for congestion, ear pain and sore throat.    Eyes: Negative.    Respiratory:  Positive for cough. Negative for shortness of breath.    Cardiovascular: Negative.    Gastrointestinal: Negative.    Endocrine: Negative.    Genitourinary: Negative.    Musculoskeletal: Negative.    Skin: Negative.    Allergic/Immunologic: Negative.    Neurological: Negative.    Hematological: Negative.    Psychiatric/Behavioral: Negative.     All other systems reviewed and are negative.  BP (!) 158/91   Pulse 79   Temp 36.1 °C (97 °F)   Resp 20   Ht 1.676 m (5' 6\")   Wt 118 kg (260 lb)   SpO2 97%   BMI 41.97 kg/m²     Objective   Physical Exam  Vitals and nursing note reviewed.   Constitutional:       Appearance: Normal appearance.   HENT:      Head: Normocephalic and atraumatic.      Mouth/Throat:      Mouth: Mucous membranes are moist.      Pharynx: Oropharynx is clear.   Cardiovascular:      Rate and Rhythm: Normal rate and regular rhythm.   Pulmonary:      Effort: Pulmonary effort is normal.      Breath sounds: Normal breath sounds.   Musculoskeletal:      Cervical back: Neck supple.   Lymphadenopathy:      Cervical: No cervical adenopathy.   Skin:     General: Skin is warm and dry.   Neurological:      General: " No focal deficit present.      Mental Status: She is alert and oriented to person, place, and time.   Psychiatric:         Mood and Affect: Mood normal.         Behavior: Behavior normal.     Assessment:  Sinobronchitis    Plan:  Doxycycline 100 mg bid x 10 days  OTC cough/cold med as directed  Fluids and rest  Pcp follow up this week if not improving or worsening  ER visit anytime 24/7 for acute worsening or changing condition

## 2024-06-18 ENCOUNTER — HOSPITAL ENCOUNTER (OUTPATIENT)
Facility: HOSPITAL | Age: 59
Setting detail: OUTPATIENT SURGERY
End: 2024-06-18
Attending: STUDENT IN AN ORGANIZED HEALTH CARE EDUCATION/TRAINING PROGRAM | Admitting: STUDENT IN AN ORGANIZED HEALTH CARE EDUCATION/TRAINING PROGRAM
Payer: MEDICARE

## 2024-07-22 ENCOUNTER — APPOINTMENT (OUTPATIENT)
Dept: PREADMISSION TESTING | Facility: HOSPITAL | Age: 59
End: 2024-07-22
Payer: MEDICARE

## 2024-09-09 ENCOUNTER — HOSPITAL ENCOUNTER (OUTPATIENT)
Dept: ORTHOPEDIC SURGERY | Age: 59
Discharge: HOME | End: 2024-09-11
Payer: MEDICARE

## 2024-09-09 ENCOUNTER — OFFICE VISIT (OUTPATIENT)
Dept: ORTHOPEDIC SURGERY | Age: 59
End: 2024-09-09
Payer: MEDICARE

## 2024-09-09 VITALS
OXYGEN SATURATION: 96 % | BODY MASS INDEX: 40.18 KG/M2 | HEIGHT: 66 IN | HEART RATE: 77 BPM | TEMPERATURE: 97.3 F | WEIGHT: 250 LBS

## 2024-09-09 DIAGNOSIS — M16.0 PRIMARY OSTEOARTHRITIS OF BOTH HIPS: Primary | ICD-10-CM

## 2024-09-09 DIAGNOSIS — M25.551 BILATERAL HIP PAIN: ICD-10-CM

## 2024-09-09 DIAGNOSIS — M25.552 BILATERAL HIP PAIN: ICD-10-CM

## 2024-09-09 PROCEDURE — 73521 X-RAY EXAM HIPS BI 2 VIEWS: CPT

## 2024-09-09 PROCEDURE — 99205 OFFICE O/P NEW HI 60 MIN: CPT | Performed by: STUDENT IN AN ORGANIZED HEALTH CARE EDUCATION/TRAINING PROGRAM

## 2024-09-09 PROCEDURE — 3017F COLORECTAL CA SCREEN DOC REV: CPT | Performed by: STUDENT IN AN ORGANIZED HEALTH CARE EDUCATION/TRAINING PROGRAM

## 2024-09-09 PROCEDURE — G8417 CALC BMI ABV UP PARAM F/U: HCPCS | Performed by: STUDENT IN AN ORGANIZED HEALTH CARE EDUCATION/TRAINING PROGRAM

## 2024-09-09 PROCEDURE — G8427 DOCREV CUR MEDS BY ELIG CLIN: HCPCS | Performed by: STUDENT IN AN ORGANIZED HEALTH CARE EDUCATION/TRAINING PROGRAM

## 2024-09-09 PROCEDURE — 73521 X-RAY EXAM HIPS BI 2 VIEWS: CPT | Performed by: STUDENT IN AN ORGANIZED HEALTH CARE EDUCATION/TRAINING PROGRAM

## 2024-09-09 PROCEDURE — 1036F TOBACCO NON-USER: CPT | Performed by: STUDENT IN AN ORGANIZED HEALTH CARE EDUCATION/TRAINING PROGRAM

## 2024-09-09 RX ORDER — LIDOCAINE 50 MG/G
1-2 PATCH TOPICAL
COMMUNITY
Start: 2011-09-12

## 2024-09-09 RX ORDER — GABAPENTIN 300 MG/1
CAPSULE ORAL
COMMUNITY

## 2024-09-09 RX ORDER — SEMAGLUTIDE 0.68 MG/ML
INJECTION, SOLUTION SUBCUTANEOUS
COMMUNITY
Start: 2024-08-28

## 2024-09-09 RX ORDER — LEVOTHYROXINE SODIUM 50 UG/1
50 CAPSULE ORAL DAILY
COMMUNITY

## 2024-09-09 RX ORDER — HYDROXYCHLOROQUINE SULFATE 200 MG/1
1 TABLET, FILM COATED ORAL 2 TIMES DAILY
COMMUNITY
Start: 2021-08-16

## 2024-09-09 RX ORDER — ERGOCALCIFEROL 1.25 MG/1
50000 CAPSULE, LIQUID FILLED ORAL WEEKLY
COMMUNITY
Start: 2024-07-25

## 2024-09-09 RX ORDER — ERGOCALCIFEROL 1.25 MG/1
50000 CAPSULE, LIQUID FILLED ORAL
COMMUNITY

## 2024-09-09 RX ORDER — CYCLOBENZAPRINE HCL 10 MG
10 TABLET ORAL 3 TIMES DAILY PRN
COMMUNITY
Start: 2024-08-17 | End: 2024-09-09

## 2024-09-09 RX ORDER — ONDANSETRON 8 MG/1
8 TABLET, FILM COATED ORAL ONCE
COMMUNITY

## 2024-09-09 RX ORDER — AMLODIPINE BESYLATE 5 MG/1
5 TABLET ORAL DAILY
COMMUNITY
Start: 2024-08-11

## 2024-09-09 RX ORDER — DULOXETIN HYDROCHLORIDE 60 MG/1
60 CAPSULE, DELAYED RELEASE ORAL DAILY
COMMUNITY

## 2024-09-09 RX ORDER — OMEGA-3/DHA/EPA/FISH OIL 300-1000MG
1200 CAPSULE ORAL DAILY
COMMUNITY

## 2024-09-09 RX ORDER — FENOFIBRATE 145 MG/1
145 TABLET, COATED ORAL DAILY
COMMUNITY

## 2024-09-09 RX ORDER — METFORMIN HCL 500 MG
1000 TABLET, EXTENDED RELEASE 24 HR ORAL 2 TIMES DAILY
COMMUNITY
Start: 2023-10-15

## 2024-09-09 RX ORDER — VALACYCLOVIR HYDROCHLORIDE 500 MG/1
500 TABLET, FILM COATED ORAL DAILY
COMMUNITY
Start: 2021-08-16

## 2024-09-09 RX ORDER — CETIRIZINE HYDROCHLORIDE 10 MG/1
10 TABLET ORAL DAILY
COMMUNITY

## 2024-09-09 RX ORDER — METOCLOPRAMIDE 10 MG/1
10 TABLET ORAL 3 TIMES DAILY
COMMUNITY
Start: 2011-09-12

## 2024-09-09 RX ORDER — ALPRAZOLAM 1 MG
TABLET ORAL
COMMUNITY

## 2024-09-09 RX ORDER — ELETRIPTAN HYDROBROMIDE 40 MG/1
40 TABLET, FILM COATED ORAL PRN
COMMUNITY
Start: 2010-10-28

## 2024-09-09 RX ORDER — NADOLOL 80 MG/1
80 TABLET ORAL 2 TIMES DAILY
COMMUNITY

## 2024-09-10 DIAGNOSIS — M16.11 PRIMARY OSTEOARTHRITIS OF RIGHT HIP: Primary | ICD-10-CM

## 2024-09-16 ENCOUNTER — PREP FOR PROCEDURE (OUTPATIENT)
Dept: ORTHOPEDIC SURGERY | Age: 59
End: 2024-09-16

## 2024-09-16 DIAGNOSIS — M16.11 PRIMARY OSTEOARTHRITIS OF RIGHT HIP: ICD-10-CM

## 2024-09-17 ENCOUNTER — TELEPHONE (OUTPATIENT)
Dept: ORTHOPEDIC SURGERY | Age: 59
End: 2024-09-17

## 2024-09-17 DIAGNOSIS — M16.11 PRIMARY OSTEOARTHRITIS OF RIGHT HIP: Primary | ICD-10-CM

## 2024-09-18 ENCOUNTER — HOSPITAL ENCOUNTER (OUTPATIENT)
Dept: CT IMAGING | Age: 59
Discharge: HOME | End: 2024-09-20
Attending: STUDENT IN AN ORGANIZED HEALTH CARE EDUCATION/TRAINING PROGRAM
Payer: MEDICARE

## 2024-09-18 DIAGNOSIS — M16.11 PRIMARY OSTEOARTHRITIS OF RIGHT HIP: ICD-10-CM

## 2024-09-18 PROCEDURE — 73700 CT LOWER EXTREMITY W/O DYE: CPT

## 2024-09-30 ENCOUNTER — HOSPITAL ENCOUNTER (OUTPATIENT)
Dept: PREADMISSION TESTING | Age: 59
Discharge: HOME | End: 2024-10-04
Payer: MEDICARE

## 2024-09-30 ENCOUNTER — OFFICE VISIT (OUTPATIENT)
Dept: ORTHOPEDIC SURGERY | Age: 59
End: 2024-09-30
Payer: MEDICARE

## 2024-09-30 VITALS
HEIGHT: 66 IN | SYSTOLIC BLOOD PRESSURE: 138 MMHG | WEIGHT: 250 LBS | BODY MASS INDEX: 40.18 KG/M2 | OXYGEN SATURATION: 97 % | DIASTOLIC BLOOD PRESSURE: 80 MMHG | HEART RATE: 69 BPM | TEMPERATURE: 96.5 F

## 2024-09-30 DIAGNOSIS — R73.9 ELEVATED BLOOD SUGAR: ICD-10-CM

## 2024-09-30 DIAGNOSIS — Z01.818 PRE-OP EXAM: Primary | ICD-10-CM

## 2024-09-30 DIAGNOSIS — N30.90 CYSTITIS: ICD-10-CM

## 2024-09-30 LAB
ABO + RH BLD: NORMAL
ANION GAP SERPL CALCULATED.3IONS-SCNC: 10 MEQ/L (ref 9–15)
BACTERIA URNS QL MICRO: ABNORMAL /HPF
BASOPHILS # BLD: 0 K/UL (ref 0–0.2)
BASOPHILS NFR BLD: 0.7 %
BILIRUB UR QL STRIP: NEGATIVE
BLD GP AB SCN SERPL QL: NORMAL
BUN SERPL-MCNC: 12 MG/DL (ref 8–23)
CALCIUM SERPL-MCNC: 9.4 MG/DL (ref 8.5–9.9)
CHLORIDE SERPL-SCNC: 106 MEQ/L (ref 95–107)
CLARITY UR: ABNORMAL
CO2 SERPL-SCNC: 26 MEQ/L (ref 20–31)
COLOR UR: YELLOW
CREAT SERPL-MCNC: 0.67 MG/DL (ref 0.5–0.9)
EOSINOPHIL # BLD: 0 K/UL (ref 0–0.7)
EOSINOPHIL NFR BLD: 0.2 %
EPI CELLS #/AREA URNS AUTO: ABNORMAL /HPF (ref 0–5)
ERYTHROCYTE [DISTWIDTH] IN BLOOD BY AUTOMATED COUNT: 13.2 % (ref 11.5–14.5)
GLUCOSE SERPL-MCNC: 120 MG/DL (ref 70–99)
GLUCOSE UR STRIP-MCNC: NEGATIVE MG/DL
HCT VFR BLD AUTO: 34.8 % (ref 37–47)
HGB BLD-MCNC: 11.9 G/DL (ref 12–16)
HGB UR QL STRIP: NEGATIVE
HYALINE CASTS #/AREA URNS AUTO: ABNORMAL /HPF (ref 0–5)
INR PPP: 0.9
KETONES UR STRIP-MCNC: NEGATIVE MG/DL
LEUKOCYTE ESTERASE UR QL STRIP: ABNORMAL
LYMPHOCYTES # BLD: 2.2 K/UL (ref 1–4.8)
LYMPHOCYTES NFR BLD: 37.3 %
MCH RBC QN AUTO: 29.8 PG (ref 27–31.3)
MCHC RBC AUTO-ENTMCNC: 34.2 % (ref 33–37)
MCV RBC AUTO: 87 FL (ref 79.4–94.8)
MONOCYTES # BLD: 0.4 K/UL (ref 0.2–0.8)
MONOCYTES NFR BLD: 6.7 %
NEUTROPHILS # BLD: 3.3 K/UL (ref 1.4–6.5)
NEUTS SEG NFR BLD: 54.8 %
NITRITE UR QL STRIP: POSITIVE
PH UR STRIP: 5.5 [PH] (ref 5–9)
PLATELET # BLD AUTO: 363 K/UL (ref 130–400)
POTASSIUM SERPL-SCNC: 3.8 MEQ/L (ref 3.4–4.9)
PROT UR STRIP-MCNC: NEGATIVE MG/DL
PROTHROMBIN TIME: 12.5 SEC (ref 12.3–14.9)
RBC # BLD AUTO: 4 M/UL (ref 4.2–5.4)
RBC #/AREA URNS HPF: ABNORMAL /HPF (ref 0–2)
SODIUM SERPL-SCNC: 142 MEQ/L (ref 135–144)
SP GR UR STRIP: 1.02 (ref 1–1.03)
UROBILINOGEN UR STRIP-ACNC: 0.2 E.U./DL
WBC # BLD AUTO: 6 K/UL (ref 4.8–10.8)
WBC #/AREA URNS AUTO: >100 /HPF (ref 0–5)

## 2024-09-30 PROCEDURE — 86850 RBC ANTIBODY SCREEN: CPT

## 2024-09-30 PROCEDURE — 83036 HEMOGLOBIN GLYCOSYLATED A1C: CPT

## 2024-09-30 PROCEDURE — 81001 URINALYSIS AUTO W/SCOPE: CPT

## 2024-09-30 PROCEDURE — 86900 BLOOD TYPING SEROLOGIC ABO: CPT

## 2024-09-30 PROCEDURE — 87641 MR-STAPH DNA AMP PROBE: CPT

## 2024-09-30 PROCEDURE — 87077 CULTURE AEROBIC IDENTIFY: CPT

## 2024-09-30 PROCEDURE — 85025 COMPLETE CBC W/AUTO DIFF WBC: CPT

## 2024-09-30 PROCEDURE — 87086 URINE CULTURE/COLONY COUNT: CPT

## 2024-09-30 PROCEDURE — 85610 PROTHROMBIN TIME: CPT

## 2024-09-30 PROCEDURE — 87186 SC STD MICRODIL/AGAR DIL: CPT

## 2024-09-30 PROCEDURE — PREOPEXAM PRE-OP EXAM: Performed by: PHYSICIAN ASSISTANT

## 2024-09-30 PROCEDURE — 80048 BASIC METABOLIC PNL TOTAL CA: CPT

## 2024-09-30 PROCEDURE — 86901 BLOOD TYPING SEROLOGIC RH(D): CPT

## 2024-09-30 PROCEDURE — 93000 ELECTROCARDIOGRAM COMPLETE: CPT | Performed by: PHYSICIAN ASSISTANT

## 2024-09-30 RX ORDER — SODIUM CHLORIDE 0.9 % (FLUSH) 0.9 %
5-40 SYRINGE (ML) INJECTION EVERY 12 HOURS SCHEDULED
OUTPATIENT
Start: 2024-10-07

## 2024-09-30 RX ORDER — SODIUM CHLORIDE 9 MG/ML
INJECTION, SOLUTION INTRAVENOUS PRN
OUTPATIENT
Start: 2024-10-07

## 2024-09-30 RX ORDER — CHLORHEXIDINE GLUCONATE 40 MG/ML
SOLUTION TOPICAL
Qty: 118 ML | Refills: 0 | Status: SHIPPED | OUTPATIENT
Start: 2024-09-30

## 2024-09-30 RX ORDER — SODIUM CHLORIDE 0.9 % (FLUSH) 0.9 %
5-40 SYRINGE (ML) INJECTION PRN
OUTPATIENT
Start: 2024-10-07

## 2024-09-30 RX ORDER — SODIUM CHLORIDE, SODIUM LACTATE, POTASSIUM CHLORIDE, CALCIUM CHLORIDE 600; 310; 30; 20 MG/100ML; MG/100ML; MG/100ML; MG/100ML
INJECTION, SOLUTION INTRAVENOUS CONTINUOUS
OUTPATIENT
Start: 2024-10-07

## 2024-09-30 NOTE — H&P
without  erosive findings or irregularity     IMPRESSION:  1. Moderate right and mild-to-moderate left hip DJD with components of joint  space narrowing however no significant remodeling, erosions or acute cortical  irregularity evident.  2. SI joints have mild-to-moderate degenerative changes without erosive  findings or irregularity.     [facility]   [address], [facility] [address]      Tel [phone] Fax [phone]     TRANSTHORACIC ECHOCARDIOGRAM REPORT       Patient Name:     JULIANNE Harrell Physician:  [id number] Maria Eugenia Kong MD   Study Date:       3/22/2021         Referring           [id number] MARIA EUGENIA TOMLIN                                       Physician:   MRN/PID:          [id number]          PCP:   Accession/Order#: [id number]      Department          [facility]                                       Location:   YOB: 1965          Fellow:   Gender:           F                 Nurse:   Admit Date:                         Sonographer:        Ange Moya   Height:           167.64 cm         CC Report to:   Weight:           114.31 kg         Study Type:         Echocardiogram   BSA:              2.21 m2   Blood Pressure: 102 /64 mmHg     Diagnosis/ICD: R94.31-Abnormal electrocardiogram [ECG] [EKG]   Indication:   Procedure/CPT: Echo Complete w Full Doppler-[id number]    Study Detail: The following Echo studies were performed: 2D, M-Mode, Doppler and                 color flow.       PHYSICIAN INTERPRETATION:   Left Ventricle: The left ventricular systolic function is normal, with an estimated ejection fraction of 60-65%. There are no regional wall motion abnormalities. The left ventricular cavity size is normal. Spectral Doppler shows an impaired relaxation pattern of left ventricular diastolic filling.   Left Atrium: The left atrium is mildly dilated.   Right Ventricle: The right ventricle is normal

## 2024-10-01 LAB
ESTIMATED AVERAGE GLUCOSE: 108 MG/DL
HBA1C MFR BLD: 5.4 % (ref 4–6)
MRSA, DNA, NASAL: NEGATIVE
SPECIMEN DESCRIPTION: NORMAL

## 2024-10-02 ENCOUNTER — TELEPHONE (OUTPATIENT)
Dept: ORTHOPEDIC SURGERY | Age: 59
End: 2024-10-02

## 2024-10-02 LAB
BACTERIA UR CULT: ABNORMAL
ORGANISM: ABNORMAL
ORGANISM: ABNORMAL

## 2024-10-02 RX ORDER — CIPROFLOXACIN 500 MG/1
500 TABLET, FILM COATED ORAL 2 TIMES DAILY
Qty: 10 TABLET | Refills: 0 | Status: SHIPPED | OUTPATIENT
Start: 2024-10-02 | End: 2024-10-07

## 2024-10-02 NOTE — TELEPHONE ENCOUNTER
Patient urine did show positive for Klebsiella pneumoniae.  She will begin Cipro.  I contacted the patient.

## 2024-10-03 ENCOUNTER — TELEPHONE (OUTPATIENT)
Dept: ORTHOPEDIC SURGERY | Age: 59
End: 2024-10-03

## 2024-10-03 NOTE — TELEPHONE ENCOUNTER
Called the patient.  I explained that due to her urinary tract infection we would have to cancel her surgery.  She will contact her primary care physician for repeat urinalysis and clearance.  She would like to move forward with surgery hopefully October 21.

## 2024-10-15 ENCOUNTER — HOSPITAL ENCOUNTER (OUTPATIENT)
Dept: PREADMISSION TESTING | Age: 59
Discharge: HOME | End: 2024-10-15

## 2024-10-17 ENCOUNTER — OFFICE VISIT (OUTPATIENT)
Dept: ORTHOPEDIC SURGERY | Age: 59
End: 2024-10-17
Payer: MEDICARE

## 2024-10-17 ENCOUNTER — HOSPITAL ENCOUNTER (OUTPATIENT)
Dept: PREADMISSION TESTING | Age: 59
Discharge: HOME | End: 2024-10-21
Payer: MEDICARE

## 2024-10-17 VITALS
OXYGEN SATURATION: 99 % | BODY MASS INDEX: 40.18 KG/M2 | HEIGHT: 66 IN | HEART RATE: 92 BPM | TEMPERATURE: 97.4 F | WEIGHT: 250 LBS

## 2024-10-17 DIAGNOSIS — G43.719 INTRACTABLE CHRONIC MIGRAINE WITHOUT AURA AND WITHOUT STATUS MIGRAINOSUS: ICD-10-CM

## 2024-10-17 DIAGNOSIS — G90.A POSTURAL ORTHOSTATIC TACHYCARDIA SYNDROME: ICD-10-CM

## 2024-10-17 DIAGNOSIS — Z01.818 PRE-OP EXAM: Primary | ICD-10-CM

## 2024-10-17 DIAGNOSIS — N30.90 CYSTITIS: ICD-10-CM

## 2024-10-17 LAB
BACTERIA URNS QL MICRO: NEGATIVE /HPF
BILIRUB UR QL STRIP: NEGATIVE
CLARITY UR: CLEAR
COLOR UR: YELLOW
EPI CELLS #/AREA URNS AUTO: ABNORMAL /HPF (ref 0–5)
GLUCOSE UR STRIP-MCNC: NEGATIVE MG/DL
HGB UR QL STRIP: NEGATIVE
HYALINE CASTS #/AREA URNS AUTO: ABNORMAL /HPF (ref 0–5)
KETONES UR STRIP-MCNC: NEGATIVE MG/DL
LEUKOCYTE ESTERASE UR QL STRIP: ABNORMAL
MUCOUS THREADS URNS QL MICRO: PRESENT /LPF
NITRITE UR QL STRIP: NEGATIVE
PH UR STRIP: 6 [PH] (ref 5–9)
PROT UR STRIP-MCNC: NEGATIVE MG/DL
RBC #/AREA URNS AUTO: ABNORMAL /HPF (ref 0–5)
SP GR UR STRIP: 1.01 (ref 1–1.03)
UROBILINOGEN UR STRIP-ACNC: 0.2 E.U./DL
WBC #/AREA URNS AUTO: ABNORMAL /HPF (ref 0–5)

## 2024-10-17 PROCEDURE — G8427 DOCREV CUR MEDS BY ELIG CLIN: HCPCS | Performed by: PHYSICIAN ASSISTANT

## 2024-10-17 PROCEDURE — 86850 RBC ANTIBODY SCREEN: CPT

## 2024-10-17 PROCEDURE — 99213 OFFICE O/P EST LOW 20 MIN: CPT | Performed by: PHYSICIAN ASSISTANT

## 2024-10-17 PROCEDURE — 86900 BLOOD TYPING SEROLOGIC ABO: CPT

## 2024-10-17 PROCEDURE — 1036F TOBACCO NON-USER: CPT | Performed by: PHYSICIAN ASSISTANT

## 2024-10-17 PROCEDURE — G8417 CALC BMI ABV UP PARAM F/U: HCPCS | Performed by: PHYSICIAN ASSISTANT

## 2024-10-17 PROCEDURE — 86901 BLOOD TYPING SEROLOGIC RH(D): CPT

## 2024-10-17 PROCEDURE — G8484 FLU IMMUNIZE NO ADMIN: HCPCS | Performed by: PHYSICIAN ASSISTANT

## 2024-10-17 PROCEDURE — 3017F COLORECTAL CA SCREEN DOC REV: CPT | Performed by: PHYSICIAN ASSISTANT

## 2024-10-17 PROCEDURE — 81001 URINALYSIS AUTO W/SCOPE: CPT

## 2024-10-17 PROCEDURE — 87086 URINE CULTURE/COLONY COUNT: CPT

## 2024-10-17 RX ORDER — CIPROFLOXACIN 500 MG/1
500 TABLET, FILM COATED ORAL 2 TIMES DAILY
Qty: 5 TABLET | Refills: 0 | Status: SHIPPED | OUTPATIENT
Start: 2024-10-17 | End: 2024-10-20

## 2024-10-18 LAB
ABO + RH BLD: NORMAL
BACTERIA UR CULT: NORMAL
BLD GP AB SCN SERPL QL: NORMAL

## 2024-10-18 RX ORDER — NADOLOL 80 MG/1
80 TABLET ORAL 2 TIMES DAILY
Qty: 60 TABLET | Refills: 0 | Status: SHIPPED | OUTPATIENT
Start: 2024-10-18

## 2024-10-21 ENCOUNTER — ANESTHESIA (OUTPATIENT)
Dept: OPERATING ROOM | Age: 59
End: 2024-10-21
Payer: MEDICARE

## 2024-10-21 ENCOUNTER — HOSPITAL ENCOUNTER (OUTPATIENT)
Age: 59
Setting detail: OBSERVATION
Discharge: HOME HEALTH | End: 2024-10-22
Attending: STUDENT IN AN ORGANIZED HEALTH CARE EDUCATION/TRAINING PROGRAM | Admitting: STUDENT IN AN ORGANIZED HEALTH CARE EDUCATION/TRAINING PROGRAM
Payer: MEDICARE

## 2024-10-21 ENCOUNTER — APPOINTMENT (OUTPATIENT)
Dept: GENERAL RADIOLOGY | Age: 59
End: 2024-10-21
Attending: STUDENT IN AN ORGANIZED HEALTH CARE EDUCATION/TRAINING PROGRAM
Payer: MEDICARE

## 2024-10-21 ENCOUNTER — ANESTHESIA EVENT (OUTPATIENT)
Dept: OPERATING ROOM | Age: 59
End: 2024-10-21
Payer: MEDICARE

## 2024-10-21 ENCOUNTER — APPOINTMENT (OUTPATIENT)
Dept: ULTRASOUND IMAGING | Age: 59
End: 2024-10-21
Attending: STUDENT IN AN ORGANIZED HEALTH CARE EDUCATION/TRAINING PROGRAM
Payer: MEDICARE

## 2024-10-21 DIAGNOSIS — Z96.641 STATUS POST TOTAL HIP REPLACEMENT, RIGHT: Primary | ICD-10-CM

## 2024-10-21 LAB
GLUCOSE BLD-MCNC: 106 MG/DL (ref 70–99)
GLUCOSE BLD-MCNC: 170 MG/DL (ref 70–99)
GLUCOSE BLD-MCNC: 201 MG/DL (ref 70–99)
PERFORMED ON: ABNORMAL

## 2024-10-21 PROCEDURE — 2720000010 HC SURG SUPPLY STERILE: Performed by: STUDENT IN AN ORGANIZED HEALTH CARE EDUCATION/TRAINING PROGRAM

## 2024-10-21 PROCEDURE — 2500000003 HC RX 250 WO HCPCS: Performed by: ANESTHESIOLOGY

## 2024-10-21 PROCEDURE — 76942 ECHO GUIDE FOR BIOPSY: CPT

## 2024-10-21 PROCEDURE — G0378 HOSPITAL OBSERVATION PER HR: HCPCS

## 2024-10-21 PROCEDURE — 6360000002 HC RX W HCPCS: Performed by: NURSE PRACTITIONER

## 2024-10-21 PROCEDURE — A4217 STERILE WATER/SALINE, 500 ML: HCPCS | Performed by: STUDENT IN AN ORGANIZED HEALTH CARE EDUCATION/TRAINING PROGRAM

## 2024-10-21 PROCEDURE — 2580000003 HC RX 258: Performed by: NURSE PRACTITIONER

## 2024-10-21 PROCEDURE — 64447 NJX AA&/STRD FEMORAL NRV IMG: CPT | Performed by: ANESTHESIOLOGY

## 2024-10-21 PROCEDURE — C1776 JOINT DEVICE (IMPLANTABLE): HCPCS | Performed by: STUDENT IN AN ORGANIZED HEALTH CARE EDUCATION/TRAINING PROGRAM

## 2024-10-21 PROCEDURE — 6360000002 HC RX W HCPCS: Performed by: STUDENT IN AN ORGANIZED HEALTH CARE EDUCATION/TRAINING PROGRAM

## 2024-10-21 PROCEDURE — 6370000000 HC RX 637 (ALT 250 FOR IP): Performed by: ANESTHESIOLOGY

## 2024-10-21 PROCEDURE — 2580000003 HC RX 258: Performed by: STUDENT IN AN ORGANIZED HEALTH CARE EDUCATION/TRAINING PROGRAM

## 2024-10-21 PROCEDURE — 3700000001 HC ADD 15 MINUTES (ANESTHESIA): Performed by: STUDENT IN AN ORGANIZED HEALTH CARE EDUCATION/TRAINING PROGRAM

## 2024-10-21 PROCEDURE — 6360000002 HC RX W HCPCS: Performed by: ANESTHESIOLOGY

## 2024-10-21 PROCEDURE — 6370000000 HC RX 637 (ALT 250 FOR IP): Performed by: NURSE PRACTITIONER

## 2024-10-21 PROCEDURE — 94664 DEMO&/EVAL PT USE INHALER: CPT

## 2024-10-21 PROCEDURE — 3600000005 HC SURGERY LEVEL 5 BASE: Performed by: STUDENT IN AN ORGANIZED HEALTH CARE EDUCATION/TRAINING PROGRAM

## 2024-10-21 PROCEDURE — 73502 X-RAY EXAM HIP UNI 2-3 VIEWS: CPT

## 2024-10-21 PROCEDURE — 2580000003 HC RX 258: Performed by: PHYSICIAN ASSISTANT

## 2024-10-21 PROCEDURE — 7100000001 HC PACU RECOVERY - ADDTL 15 MIN: Performed by: STUDENT IN AN ORGANIZED HEALTH CARE EDUCATION/TRAINING PROGRAM

## 2024-10-21 PROCEDURE — 6370000000 HC RX 637 (ALT 250 FOR IP): Performed by: INTERNAL MEDICINE

## 2024-10-21 PROCEDURE — 3700000000 HC ANESTHESIA ATTENDED CARE: Performed by: STUDENT IN AN ORGANIZED HEALTH CARE EDUCATION/TRAINING PROGRAM

## 2024-10-21 PROCEDURE — 2580000003 HC RX 258: Performed by: ANESTHESIOLOGY

## 2024-10-21 PROCEDURE — 6360000002 HC RX W HCPCS: Performed by: PHYSICIAN ASSISTANT

## 2024-10-21 PROCEDURE — 97162 PT EVAL MOD COMPLEX 30 MIN: CPT

## 2024-10-21 PROCEDURE — 7100000000 HC PACU RECOVERY - FIRST 15 MIN: Performed by: STUDENT IN AN ORGANIZED HEALTH CARE EDUCATION/TRAINING PROGRAM

## 2024-10-21 PROCEDURE — 3600000015 HC SURGERY LEVEL 5 ADDTL 15MIN: Performed by: STUDENT IN AN ORGANIZED HEALTH CARE EDUCATION/TRAINING PROGRAM

## 2024-10-21 PROCEDURE — 97116 GAIT TRAINING THERAPY: CPT

## 2024-10-21 PROCEDURE — 97530 THERAPEUTIC ACTIVITIES: CPT

## 2024-10-21 PROCEDURE — 2709999900 HC NON-CHARGEABLE SUPPLY: Performed by: STUDENT IN AN ORGANIZED HEALTH CARE EDUCATION/TRAINING PROGRAM

## 2024-10-21 DEVICE — CERAMIC V40 FEMORAL HEAD
Type: IMPLANTABLE DEVICE | Site: HIP | Status: FUNCTIONAL
Brand: BIOLOX

## 2024-10-21 DEVICE — TRIDENT X3 10 DEGREE POLYETHYLENE INSERT
Type: IMPLANTABLE DEVICE | Site: HIP | Status: FUNCTIONAL
Brand: TRIDENT X3 INSERT

## 2024-10-21 DEVICE — TRIDENT II TRITANIUM CLUSTER 48D
Type: IMPLANTABLE DEVICE | Site: HIP | Status: FUNCTIONAL
Brand: TRIDENT II

## 2024-10-21 DEVICE — HIP STEM - STANDARD OFFSET
Type: IMPLANTABLE DEVICE | Site: HIP | Status: FUNCTIONAL
Brand: INSIGNIA

## 2024-10-21 RX ORDER — SODIUM CHLORIDE 9 MG/ML
INJECTION, SOLUTION INTRAVENOUS CONTINUOUS
Status: DISCONTINUED | OUTPATIENT
Start: 2024-10-21 | End: 2024-10-22

## 2024-10-21 RX ORDER — ONDANSETRON 4 MG/1
4 TABLET, ORALLY DISINTEGRATING ORAL EVERY 8 HOURS PRN
Status: DISCONTINUED | OUTPATIENT
Start: 2024-10-21 | End: 2024-10-22 | Stop reason: HOSPADM

## 2024-10-21 RX ORDER — TRANEXAMIC ACID 650 MG/1
1950 TABLET ORAL ONCE
Status: COMPLETED | OUTPATIENT
Start: 2024-10-22 | End: 2024-10-22

## 2024-10-21 RX ORDER — SODIUM CHLORIDE 0.9 % (FLUSH) 0.9 %
5-40 SYRINGE (ML) INJECTION PRN
Status: DISCONTINUED | OUTPATIENT
Start: 2024-10-21 | End: 2024-10-21 | Stop reason: HOSPADM

## 2024-10-21 RX ORDER — LIDOCAINE HYDROCHLORIDE 10 MG/ML
INJECTION, SOLUTION INFILTRATION; PERINEURAL
Status: DISCONTINUED | OUTPATIENT
Start: 2024-10-21 | End: 2024-10-21 | Stop reason: SDUPTHER

## 2024-10-21 RX ORDER — SODIUM CHLORIDE, SODIUM LACTATE, POTASSIUM CHLORIDE, CALCIUM CHLORIDE 600; 310; 30; 20 MG/100ML; MG/100ML; MG/100ML; MG/100ML
INJECTION, SOLUTION INTRAVENOUS CONTINUOUS
Status: DISCONTINUED | OUTPATIENT
Start: 2024-10-21 | End: 2024-10-21 | Stop reason: HOSPADM

## 2024-10-21 RX ORDER — MIDAZOLAM HYDROCHLORIDE 1 MG/ML
INJECTION, SOLUTION INTRAMUSCULAR; INTRAVENOUS
Status: DISCONTINUED | OUTPATIENT
Start: 2024-10-21 | End: 2024-10-21 | Stop reason: SDUPTHER

## 2024-10-21 RX ORDER — SENNA AND DOCUSATE SODIUM 50; 8.6 MG/1; MG/1
1 TABLET, FILM COATED ORAL 2 TIMES DAILY
Status: DISCONTINUED | OUTPATIENT
Start: 2024-10-21 | End: 2024-10-22 | Stop reason: HOSPADM

## 2024-10-21 RX ORDER — KETOROLAC TROMETHAMINE 30 MG/ML
INJECTION, SOLUTION INTRAMUSCULAR; INTRAVENOUS PRN
Status: DISCONTINUED | OUTPATIENT
Start: 2024-10-21 | End: 2024-10-21 | Stop reason: ALTCHOICE

## 2024-10-21 RX ORDER — ONDANSETRON 2 MG/ML
4 INJECTION INTRAMUSCULAR; INTRAVENOUS EVERY 6 HOURS PRN
Status: DISCONTINUED | OUTPATIENT
Start: 2024-10-21 | End: 2024-10-22 | Stop reason: HOSPADM

## 2024-10-21 RX ORDER — SODIUM CHLORIDE 9 MG/ML
INJECTION, SOLUTION INTRAVENOUS PRN
Status: DISCONTINUED | OUTPATIENT
Start: 2024-10-21 | End: 2024-10-21 | Stop reason: HOSPADM

## 2024-10-21 RX ORDER — CYCLOBENZAPRINE HCL 10 MG
10 TABLET ORAL 3 TIMES DAILY PRN
Status: DISCONTINUED | OUTPATIENT
Start: 2024-10-21 | End: 2024-10-22 | Stop reason: HOSPADM

## 2024-10-21 RX ORDER — SODIUM CHLORIDE 0.9 % (FLUSH) 0.9 %
5-40 SYRINGE (ML) INJECTION EVERY 12 HOURS SCHEDULED
Status: DISCONTINUED | OUTPATIENT
Start: 2024-10-21 | End: 2024-10-21 | Stop reason: HOSPADM

## 2024-10-21 RX ORDER — ONDANSETRON 2 MG/ML
4 INJECTION INTRAMUSCULAR; INTRAVENOUS
Status: DISCONTINUED | OUTPATIENT
Start: 2024-10-21 | End: 2024-10-21 | Stop reason: HOSPADM

## 2024-10-21 RX ORDER — PROPOFOL 10 MG/ML
INJECTION, EMULSION INTRAVENOUS
Status: DISCONTINUED | OUTPATIENT
Start: 2024-10-21 | End: 2024-10-21 | Stop reason: SDUPTHER

## 2024-10-21 RX ORDER — OXYCODONE HYDROCHLORIDE 5 MG/1
5 TABLET ORAL EVERY 4 HOURS PRN
Status: DISCONTINUED | OUTPATIENT
Start: 2024-10-21 | End: 2024-10-22 | Stop reason: HOSPADM

## 2024-10-21 RX ORDER — VALACYCLOVIR HYDROCHLORIDE 500 MG/1
500 TABLET, FILM COATED ORAL DAILY
Status: DISCONTINUED | OUTPATIENT
Start: 2024-10-21 | End: 2024-10-22 | Stop reason: HOSPADM

## 2024-10-21 RX ORDER — SODIUM CHLORIDE 9 MG/ML
INJECTION, SOLUTION INTRAVENOUS
Status: DISPENSED
Start: 2024-10-21 | End: 2024-10-22

## 2024-10-21 RX ORDER — FENTANYL CITRATE 0.05 MG/ML
50 INJECTION, SOLUTION INTRAMUSCULAR; INTRAVENOUS EVERY 10 MIN PRN
Status: DISCONTINUED | OUTPATIENT
Start: 2024-10-21 | End: 2024-10-21 | Stop reason: HOSPADM

## 2024-10-21 RX ORDER — METOCLOPRAMIDE HYDROCHLORIDE 5 MG/ML
10 INJECTION INTRAMUSCULAR; INTRAVENOUS
Status: DISCONTINUED | OUTPATIENT
Start: 2024-10-21 | End: 2024-10-21 | Stop reason: HOSPADM

## 2024-10-21 RX ORDER — BUPIVACAINE HYDROCHLORIDE 5 MG/ML
INJECTION, SOLUTION EPIDURAL; INTRACAUDAL PRN
Status: DISCONTINUED | OUTPATIENT
Start: 2024-10-21 | End: 2024-10-21 | Stop reason: ALTCHOICE

## 2024-10-21 RX ORDER — KETOROLAC TROMETHAMINE 15 MG/ML
7.5 INJECTION, SOLUTION INTRAMUSCULAR; INTRAVENOUS EVERY 6 HOURS
Status: COMPLETED | OUTPATIENT
Start: 2024-10-21 | End: 2024-10-22

## 2024-10-21 RX ORDER — AMLODIPINE BESYLATE 5 MG/1
5 TABLET ORAL DAILY
Status: DISCONTINUED | OUTPATIENT
Start: 2024-10-21 | End: 2024-10-22 | Stop reason: HOSPADM

## 2024-10-21 RX ORDER — DULOXETIN HYDROCHLORIDE 30 MG/1
60 CAPSULE, DELAYED RELEASE ORAL DAILY
Status: DISCONTINUED | OUTPATIENT
Start: 2024-10-21 | End: 2024-10-22 | Stop reason: HOSPADM

## 2024-10-21 RX ORDER — LABETALOL HYDROCHLORIDE 5 MG/ML
INJECTION, SOLUTION INTRAVENOUS
Status: DISCONTINUED | OUTPATIENT
Start: 2024-10-21 | End: 2024-10-21 | Stop reason: SDUPTHER

## 2024-10-21 RX ORDER — INSULIN LISPRO 100 [IU]/ML
0-8 INJECTION, SOLUTION INTRAVENOUS; SUBCUTANEOUS
Status: DISCONTINUED | OUTPATIENT
Start: 2024-10-21 | End: 2024-10-22 | Stop reason: HOSPADM

## 2024-10-21 RX ORDER — HYDROXYZINE HYDROCHLORIDE 10 MG/1
10 TABLET, FILM COATED ORAL 3 TIMES DAILY
Status: DISCONTINUED | OUTPATIENT
Start: 2024-10-21 | End: 2024-10-22 | Stop reason: HOSPADM

## 2024-10-21 RX ORDER — OXYCODONE HYDROCHLORIDE 5 MG/1
5 TABLET ORAL
Status: DISCONTINUED | OUTPATIENT
Start: 2024-10-21 | End: 2024-10-21 | Stop reason: HOSPADM

## 2024-10-21 RX ORDER — ACETAMINOPHEN 325 MG/1
650 TABLET ORAL EVERY 6 HOURS
Status: DISCONTINUED | OUTPATIENT
Start: 2024-10-21 | End: 2024-10-22 | Stop reason: HOSPADM

## 2024-10-21 RX ORDER — MAGNESIUM HYDROXIDE 1200 MG/15ML
LIQUID ORAL CONTINUOUS PRN
Status: COMPLETED | OUTPATIENT
Start: 2024-10-21 | End: 2024-10-21

## 2024-10-21 RX ORDER — PANTOPRAZOLE SODIUM 40 MG/1
40 TABLET, DELAYED RELEASE ORAL
Status: DISCONTINUED | OUTPATIENT
Start: 2024-10-22 | End: 2024-10-22 | Stop reason: HOSPADM

## 2024-10-21 RX ORDER — METOCLOPRAMIDE 10 MG/1
10 TABLET ORAL 3 TIMES DAILY
Status: DISCONTINUED | OUTPATIENT
Start: 2024-10-21 | End: 2024-10-22 | Stop reason: HOSPADM

## 2024-10-21 RX ORDER — ASPIRIN 81 MG/1
81 TABLET ORAL 2 TIMES DAILY
Status: DISCONTINUED | OUTPATIENT
Start: 2024-10-21 | End: 2024-10-22 | Stop reason: HOSPADM

## 2024-10-21 RX ORDER — DEXAMETHASONE SODIUM PHOSPHATE 10 MG/ML
10 INJECTION, SOLUTION INTRAMUSCULAR; INTRAVENOUS ONCE
Status: COMPLETED | OUTPATIENT
Start: 2024-10-21 | End: 2024-10-21

## 2024-10-21 RX ORDER — BUPIVACAINE HYDROCHLORIDE 7.5 MG/ML
INJECTION, SOLUTION INTRASPINAL
Status: DISCONTINUED | OUTPATIENT
Start: 2024-10-21 | End: 2024-10-21 | Stop reason: SDUPTHER

## 2024-10-21 RX ORDER — OXYCODONE HYDROCHLORIDE 5 MG/1
2.5 TABLET ORAL EVERY 4 HOURS PRN
Status: DISCONTINUED | OUTPATIENT
Start: 2024-10-21 | End: 2024-10-22 | Stop reason: HOSPADM

## 2024-10-21 RX ORDER — TRANEXAMIC ACID 650 MG/1
1950 TABLET ORAL
Status: DISCONTINUED | OUTPATIENT
Start: 2024-10-21 | End: 2024-10-21 | Stop reason: HOSPADM

## 2024-10-21 RX ORDER — NADOLOL 40 MG/1
80 TABLET ORAL 2 TIMES DAILY
Status: DISCONTINUED | OUTPATIENT
Start: 2024-10-21 | End: 2024-10-22 | Stop reason: HOSPADM

## 2024-10-21 RX ORDER — DIPHENHYDRAMINE HYDROCHLORIDE 50 MG/ML
12.5 INJECTION INTRAMUSCULAR; INTRAVENOUS
Status: DISCONTINUED | OUTPATIENT
Start: 2024-10-21 | End: 2024-10-21 | Stop reason: HOSPADM

## 2024-10-21 RX ORDER — GABAPENTIN 300 MG/1
600 CAPSULE ORAL 3 TIMES DAILY
Status: DISCONTINUED | OUTPATIENT
Start: 2024-10-21 | End: 2024-10-22 | Stop reason: HOSPADM

## 2024-10-21 RX ORDER — FENOFIBRATE 160 MG/1
160 TABLET ORAL DAILY
Status: DISCONTINUED | OUTPATIENT
Start: 2024-10-21 | End: 2024-10-22 | Stop reason: HOSPADM

## 2024-10-21 RX ORDER — METFORMIN HYDROCHLORIDE 500 MG/1
1000 TABLET, EXTENDED RELEASE ORAL 2 TIMES DAILY
Status: DISCONTINUED | OUTPATIENT
Start: 2024-10-21 | End: 2024-10-22 | Stop reason: HOSPADM

## 2024-10-21 RX ORDER — SODIUM CHLORIDE, SODIUM LACTATE, POTASSIUM CHLORIDE, CALCIUM CHLORIDE 600; 310; 30; 20 MG/100ML; MG/100ML; MG/100ML; MG/100ML
INJECTION, SOLUTION INTRAVENOUS
Status: DISCONTINUED | OUTPATIENT
Start: 2024-10-21 | End: 2024-10-21 | Stop reason: SDUPTHER

## 2024-10-21 RX ORDER — TRANEXAMIC ACID 650 MG/1
1950 TABLET ORAL EVERY 8 HOURS
Status: COMPLETED | OUTPATIENT
Start: 2024-10-21 | End: 2024-10-21

## 2024-10-21 RX ORDER — CELECOXIB 100 MG/1
200 CAPSULE ORAL ONCE
Status: COMPLETED | OUTPATIENT
Start: 2024-10-21 | End: 2024-10-21

## 2024-10-21 RX ORDER — NALOXONE HYDROCHLORIDE 0.4 MG/ML
INJECTION, SOLUTION INTRAMUSCULAR; INTRAVENOUS; SUBCUTANEOUS PRN
Status: DISCONTINUED | OUTPATIENT
Start: 2024-10-21 | End: 2024-10-21 | Stop reason: HOSPADM

## 2024-10-21 RX ORDER — SCOLOPAMINE TRANSDERMAL SYSTEM 1 MG/1
1 PATCH, EXTENDED RELEASE TRANSDERMAL ONCE
Status: DISCONTINUED | OUTPATIENT
Start: 2024-10-21 | End: 2024-10-22 | Stop reason: HOSPADM

## 2024-10-21 RX ORDER — ACETAMINOPHEN 500 MG
1000 TABLET ORAL ONCE
Status: COMPLETED | OUTPATIENT
Start: 2024-10-21 | End: 2024-10-21

## 2024-10-21 RX ORDER — ROPIVACAINE HYDROCHLORIDE 5 MG/ML
INJECTION, SOLUTION EPIDURAL; INFILTRATION; PERINEURAL
Status: COMPLETED
Start: 2024-10-21 | End: 2024-10-21

## 2024-10-21 RX ORDER — ROPIVACAINE HYDROCHLORIDE 5 MG/ML
INJECTION, SOLUTION EPIDURAL; INFILTRATION; PERINEURAL
Status: COMPLETED | OUTPATIENT
Start: 2024-10-21 | End: 2024-10-21

## 2024-10-21 RX ORDER — MAGNESIUM HYDROXIDE/ALUMINUM HYDROXICE/SIMETHICONE 120; 1200; 1200 MG/30ML; MG/30ML; MG/30ML
15 SUSPENSION ORAL EVERY 6 HOURS PRN
Status: DISCONTINUED | OUTPATIENT
Start: 2024-10-21 | End: 2024-10-22 | Stop reason: HOSPADM

## 2024-10-21 RX ORDER — ALPRAZOLAM 0.5 MG
0.5 TABLET ORAL 3 TIMES DAILY PRN
Status: DISCONTINUED | OUTPATIENT
Start: 2024-10-21 | End: 2024-10-22 | Stop reason: HOSPADM

## 2024-10-21 RX ADMIN — ROPIVACAINE HYDROCHLORIDE 20 ML: 5 INJECTION, SOLUTION EPIDURAL; INFILTRATION; PERINEURAL at 09:19

## 2024-10-21 RX ADMIN — SENNOSIDES AND DOCUSATE SODIUM 1 TABLET: 50; 8.6 TABLET ORAL at 13:06

## 2024-10-21 RX ADMIN — NADOLOL 80 MG: 40 TABLET ORAL at 13:51

## 2024-10-21 RX ADMIN — GABAPENTIN 600 MG: 300 CAPSULE ORAL at 13:51

## 2024-10-21 RX ADMIN — CEFAZOLIN 2000 MG: 2 INJECTION, POWDER, FOR SOLUTION INTRAMUSCULAR; INTRAVENOUS at 09:42

## 2024-10-21 RX ADMIN — CEFAZOLIN 2000 MG: 1 INJECTION, POWDER, FOR SOLUTION INTRAMUSCULAR; INTRAVENOUS at 17:08

## 2024-10-21 RX ADMIN — NADOLOL 80 MG: 40 TABLET ORAL at 20:25

## 2024-10-21 RX ADMIN — BUPIVACAINE HYDROCHLORIDE IN DEXTROSE 1.4 ML: 7.5 INJECTION, SOLUTION SUBARACHNOID at 09:30

## 2024-10-21 RX ADMIN — PROPOFOL 100 MCG/KG/MIN: 10 INJECTION, EMULSION INTRAVENOUS at 09:40

## 2024-10-21 RX ADMIN — DULOXETINE HYDROCHLORIDE 60 MG: 30 CAPSULE, DELAYED RELEASE ORAL at 13:54

## 2024-10-21 RX ADMIN — CYCLOBENZAPRINE 10 MG: 10 TABLET, FILM COATED ORAL at 13:54

## 2024-10-21 RX ADMIN — INSULIN LISPRO 2 UNITS: 100 INJECTION, SOLUTION INTRAVENOUS; SUBCUTANEOUS at 17:08

## 2024-10-21 RX ADMIN — ACETAMINOPHEN 1000 MG: 500 TABLET ORAL at 08:54

## 2024-10-21 RX ADMIN — AMLODIPINE BESYLATE 5 MG: 5 TABLET ORAL at 13:51

## 2024-10-21 RX ADMIN — METOCLOPRAMIDE 10 MG: 10 TABLET ORAL at 13:50

## 2024-10-21 RX ADMIN — DEXAMETHASONE SODIUM PHOSPHATE 10 MG: 10 INJECTION INTRAMUSCULAR; INTRAVENOUS at 11:57

## 2024-10-21 RX ADMIN — SODIUM CHLORIDE, POTASSIUM CHLORIDE, SODIUM LACTATE AND CALCIUM CHLORIDE: 600; 310; 30; 20 INJECTION, SOLUTION INTRAVENOUS at 08:59

## 2024-10-21 RX ADMIN — ASPIRIN 81 MG: 81 TABLET, COATED ORAL at 13:06

## 2024-10-21 RX ADMIN — PROPOFOL 20 MG: 10 INJECTION, EMULSION INTRAVENOUS at 10:16

## 2024-10-21 RX ADMIN — METOCLOPRAMIDE 10 MG: 10 TABLET ORAL at 20:26

## 2024-10-21 RX ADMIN — ASPIRIN 81 MG: 81 TABLET, COATED ORAL at 20:19

## 2024-10-21 RX ADMIN — HYDROXYZINE HYDROCHLORIDE 10 MG: 10 TABLET ORAL at 20:26

## 2024-10-21 RX ADMIN — KETOROLAC TROMETHAMINE 7.5 MG: 15 INJECTION, SOLUTION INTRAMUSCULAR; INTRAVENOUS at 17:08

## 2024-10-21 RX ADMIN — FENOFIBRATE 160 MG: 160 TABLET ORAL at 13:51

## 2024-10-21 RX ADMIN — ACETAMINOPHEN 650 MG: 325 TABLET ORAL at 13:54

## 2024-10-21 RX ADMIN — CELECOXIB 200 MG: 100 CAPSULE ORAL at 08:55

## 2024-10-21 RX ADMIN — PROPOFOL 50 MG: 10 INJECTION, EMULSION INTRAVENOUS at 09:39

## 2024-10-21 RX ADMIN — LABETALOL HYDROCHLORIDE 10 MG: 5 INJECTION, SOLUTION INTRAVENOUS at 11:17

## 2024-10-21 RX ADMIN — LIDOCAINE HYDROCHLORIDE 5 ML: 10 INJECTION, SOLUTION INFILTRATION; PERINEURAL at 09:19

## 2024-10-21 RX ADMIN — SODIUM CHLORIDE, POTASSIUM CHLORIDE, SODIUM LACTATE AND CALCIUM CHLORIDE: 600; 310; 30; 20 INJECTION, SOLUTION INTRAVENOUS at 09:34

## 2024-10-21 RX ADMIN — ACETAMINOPHEN 650 MG: 325 TABLET ORAL at 20:19

## 2024-10-21 RX ADMIN — VALACYCLOVIR HYDROCHLORIDE 500 MG: 500 TABLET, FILM COATED ORAL at 13:50

## 2024-10-21 RX ADMIN — KETOROLAC TROMETHAMINE 7.5 MG: 15 INJECTION, SOLUTION INTRAMUSCULAR; INTRAVENOUS at 23:38

## 2024-10-21 RX ADMIN — METFORMIN HYDROCHLORIDE 1000 MG: 500 TABLET, EXTENDED RELEASE ORAL at 13:51

## 2024-10-21 RX ADMIN — CYCLOBENZAPRINE 10 MG: 10 TABLET, FILM COATED ORAL at 23:37

## 2024-10-21 RX ADMIN — HYDROXYZINE HYDROCHLORIDE 10 MG: 10 TABLET ORAL at 13:51

## 2024-10-21 RX ADMIN — SODIUM CHLORIDE: 9 INJECTION, SOLUTION INTRAVENOUS at 13:06

## 2024-10-21 RX ADMIN — TRANEXAMIC ACID 1950 MG: 650 TABLET ORAL at 08:55

## 2024-10-21 RX ADMIN — MIDAZOLAM HYDROCHLORIDE 2 MG: 2 INJECTION, SOLUTION INTRAMUSCULAR; INTRAVENOUS at 09:18

## 2024-10-21 RX ADMIN — TRANEXAMIC ACID 1950 MG: 650 TABLET ORAL at 17:10

## 2024-10-21 RX ADMIN — METFORMIN HYDROCHLORIDE 1000 MG: 500 TABLET, EXTENDED RELEASE ORAL at 20:20

## 2024-10-21 RX ADMIN — GABAPENTIN 600 MG: 300 CAPSULE ORAL at 20:20

## 2024-10-21 ASSESSMENT — PAIN SCALES - GENERAL: PAINLEVEL_OUTOF10: 5

## 2024-10-21 ASSESSMENT — PAIN - FUNCTIONAL ASSESSMENT
PAIN_FUNCTIONAL_ASSESSMENT: NONE - DENIES PAIN
PAIN_FUNCTIONAL_ASSESSMENT: 0-10

## 2024-10-21 ASSESSMENT — PAIN DESCRIPTION - ORIENTATION: ORIENTATION: RIGHT

## 2024-10-21 ASSESSMENT — PAIN DESCRIPTION - DESCRIPTORS
DESCRIPTORS: ACHING
DESCRIPTORS: ACHING

## 2024-10-21 ASSESSMENT — PAIN DESCRIPTION - LOCATION
LOCATION: HIP
LOCATION: BACK

## 2024-10-21 NOTE — FLOWSHEET NOTE
Pt able to file aronf umbilical area to toe.  Tingling in toes  able to move toes on the right side

## 2024-10-21 NOTE — OP NOTE
Operative Note      Patient: Twyla Loyola  YOB: 1965  MRN: [id number]    Date of Procedure: 10/21/2024    Pre-Op Diagnosis Codes:      * Primary osteoarthritis of right hip [M16.11]    Post-Op Diagnosis: Post-Op Diagnosis Codes:     * Primary osteoarthritis of right hip [M16.11]       Procedure(s):  Right posterior total hip replacement with Luis A robotic assistance    Surgeon(s):  Denis Marcus MD    Assistant:   First Assistant: Bettina Peng    Anesthesia: Spinal, periarticular injection    Estimated Blood Loss (mL): 500    Complications: None    Specimens:   * No specimens in log *    Implants:  Implant Name Type Inv. Item Serial No.  Lot No. LRB No. Used Action   SHELL ACET SZ D [id number] CLUS H TRITANIUM PRESSFIT CARINA - [id number]  SHELL ACET SZ D [id number] CLUS H TRITANIUM PRESSFIT CARINA  [employer] [facility] [id number] Right 1 Implanted   INSERT ACET D 10 DEG 32 MM HIP X3 TRIDENT - [id number]  INSERT ACET D 10 DEG 32 MM HIP X3 TRIDENT  [employer] [facility] [id number] Right 1 Implanted   HEAD FEM [id number] +4MM OFFSET BIOLOX DELT CERAMIC V40 TAPR - [id number]  HEAD FEM [id number] +4MM OFFSET BIOLOX DELT CERAMIC V40 TAPR  [employer] [facility] [id number] Right 1 Implanted   STEM FEM STD OFFSET 3 HIP CLLRD INSIGNIA - [id number]  STEM FEM STD OFFSET 3 HIP CLLRD INSIGNIA  [employer] [facility] [id number] Right 1 Implanted         Drains: * No LDAs found *    Findings:  Infection Present At Time Of Surgery (PATOS) (choose all levels that have infection present):  No infection present  Other Findings: Right hip DJD    Detailed Description of Procedure:     SURGEON: Denis Marcus M.D.    ASSISTANT(S): Bettina Xavier    No qualified resident was available to assist during the case. The assistance was needed in helping position the patient, holding retractors, and assisting with wound closure.    PREOPERATIVE DIAGNOSIS:  Advanced degenerative joint disease secondary to primary

## 2024-10-21 NOTE — DISCHARGE INSTRUCTIONS
Surgical Dressing and Incision Care- Post-Op Hip    Prineo (mesh & glue)        Aquacel       Prevena (Incisional vac) with Tegaderm        The surgical dressing covering your incision is determined by the quality of your skin and incision during surgery.      The type of dressing does NOT have an impact on or change your rehab.      I like to use an Aquacel dressing on top of the mesh & glue dressing to prevent any wear from clothing on the underlying mesh dressing/incision. The top dressing is not required but I find it makes things more comfortable. All the dressings are water resistant, and it is okay to shower with them but do not take a bath or soak them.    If the Aquacel dressing begins to peel off before one week or you have problems with it let us know, but you can place another dressing over the glue and mesh dressing if necessary. Do not remove the top dressing until 7 days after you are discharged from the hospital. If the Aquacel dressing on top is looking fine, you can just leave it alone until I see you at your post op. Remember: It is very important to not remove or interrupt the underlying glue and mesh dressing.      With the vac dressing (on the right above), I typically will use staples underneath and these staples will be removed at your post operative appointment. Staple removal can be uncomfortable but does not cause a lot of pain. The vac dressing machine usually turns off on its own at about 1 week unless you have one that is designed to last 14 days. If (or when) the vac turns off, gently peel back the adhesive film, and then place another regular dressing over top of the incision. If you are having any problems   with the vac (persistent alarm/making noises/not sucking down normally/etc.), call our office if during regular hours. If in doubt at all just leave the dressing part on and contact us. Sometimes these vacs can be finicky but even if it acts up, it doesn't typically cause long

## 2024-10-21 NOTE — ANESTHESIA PROCEDURE NOTES
Peripheral Block    Patient location during procedure: pre-op  Reason for block: post-op pain management and at surgeon's request  Start time: 10/21/2024 9:19 AM  End time: 10/21/2024 9:23 AM  Staffing  Performed: anesthesiologist   Anesthesiologist: Natalie Alicia MD  Performed by: Natalie Alicia MD  Authorized by: Natalie Alicia MD    Preanesthetic Checklist  Completed: patient identified, IV checked, site marked, risks and benefits discussed, surgical/procedural consents, equipment checked, pre-op evaluation, timeout performed, anesthesia consent given, oxygen available and monitors applied/VS acknowledged  Peripheral Block   Patient position: supine  Prep: ChloraPrep  Provider prep: mask and sterile gloves (Sterile probe cover)  Patient monitoring: cardiac monitor, continuous pulse ox, frequent blood pressure checks and IV access  Block type: PENG  Laterality: right  Injection technique: single-shot  Guidance: ultrasound guided  Local infiltration: lidocaine  Infiltration strength: 1 %  Local infiltration: lidocaine  Dose: 1 mL    Needle   Needle type: combined needle/nerve stimulator   Needle gauge: 21 G  Needle localization: anatomical landmarks and ultrasound guidance  Needle length: 10 cm  Assessment   Injection assessment: negative aspiration for heme, no paresthesia on injection and local visualized surrounding nerve on ultrasound  Paresthesia pain: immediately resolved  Slow fractionated injection: yes  Hemodynamics: stable    Additional Notes  Ultrasound guidance used to view needle for placement.    Ultrasound image stored,  printed and saved in patient chart.    Sterile probe cover used    Lidocaine 1% 5 ml added to ropivacaine  Medications Administered  ropivacaine (NAROPIN) injection 0.5% - Perineural   20 mL - 10/21/2024 9:19:00 AM

## 2024-10-21 NOTE — ANESTHESIA PRE PROCEDURE
Tobacco Use    Smoking status: Never    Smokeless tobacco: Never   Substance Use Topics    Alcohol use: Never                                Counseling given: Not Answered      Vital Signs (Current):   Vitals:    10/21/24 0850 10/21/24 0902 10/21/24 0933   BP: (!) 197/93 (!) 183/92 (!) 164/85   Pulse: 74  75   Resp: 16  16   Temp: (!) 96.7 °F (35.9 °C)     TempSrc: Temporal     SpO2: 93%  96%   Weight: 113.4 kg (250 lb)     Height: 1.676 m (5' 6\")                                                BP Readings from Last 3 Encounters:   10/21/24 (!) 164/85   09/30/24 138/80       NPO Status: Time of last liquid consumption: 0300                        Time of last solid consumption: 1800                        Date of last liquid consumption: 10/21/24                        Date of last solid food consumption: 10/20/24    BMI:   Wt Readings from Last 3 Encounters:   10/21/24 113.4 kg (250 lb)   10/17/24 113.4 kg (250 lb)   09/30/24 113.4 kg (250 lb)     Body mass index is 40.35 kg/m².    CBC:   Lab Results   Component Value Date/Time    WBC 6.0 09/30/2024 02:15 PM    RBC 4.00 09/30/2024 02:15 PM    HGB 11.9 09/30/2024 02:15 PM    HCT 34.8 09/30/2024 02:15 PM    MCV 87.0 09/30/2024 02:15 PM    RDW 13.2 09/30/2024 02:15 PM     09/30/2024 02:15 PM       CMP:   Lab Results   Component Value Date/Time     09/30/2024 02:15 PM    K 3.8 09/30/2024 02:15 PM     09/30/2024 02:15 PM    CO2 26 09/30/2024 02:15 PM    BUN 12 09/30/2024 02:15 PM    CREATININE 0.67 09/30/2024 02:15 PM    LABGLOM >90.0 09/30/2024 02:15 PM    GLUCOSE 120 09/30/2024 02:15 PM    CALCIUM 9.4 09/30/2024 02:15 PM       POC Tests:   Recent Labs     10/21/24  0851   POCGLU 106*       Coags:   Lab Results   Component Value Date/Time    PROTIME 12.5 09/30/2024 02:15 PM    INR 0.9 09/30/2024 02:15 PM       HCG (If Applicable): No results found for: \"PREGTESTUR\", \"PREGSERUM\", \"HCG\", \"HCGQUANT\"     ABGs: No results found for: \"PHART\", \"PO2ART\",

## 2024-10-21 NOTE — ANESTHESIA POSTPROCEDURE EVALUATION
Department of Anesthesiology  Postprocedure Note    Patient: Twyla Loyola  MRN: [id number]  YOB: 1965  Date of evaluation: 10/21/2024    Procedure Summary       Date: 10/21/24 Room / Location: [address]    Anesthesia Start: 0934 Anesthesia Stop: 1142    Procedure: Right posterior total hip replacement with Luis A robotic assistance,[place] insebony, 2 assistants, Sky NICOLE aware (Right: Hip) Diagnosis:       Primary osteoarthritis of right hip      (Primary osteoarthritis of right hip [M16.11])    Surgeons: Denis Marcus MD Responsible Provider: Natalie Alicia MD    Anesthesia Type: MAC, regional, spinal ASA Status: 3            Anesthesia Type: No value filed.    Ernestine Phase I: Ernestine Score: 9    Ernestine Phase II:      Anesthesia Post Evaluation    Patient location during evaluation: PACU  Patient participation: complete - patient participated  Level of consciousness: awake and awake and alert  Pain score: 0  Airway patency: patent  Nausea & Vomiting: no vomiting and no nausea  Cardiovascular status: hemodynamically stable  Respiratory status: nasal cannula  Hydration status: stable  Multimodal analgesia pain management approach  Pain management: adequate    No notable events documented.

## 2024-10-21 NOTE — ANESTHESIA PROCEDURE NOTES
Spinal Block    Patient location during procedure: pre-op  End time: 10/21/2024 9:30 AM  Reason for block: primary anesthetic  Staffing  Performed: anesthesiologist   Anesthesiologist: Natalie Alicia MD  Performed by: Natalie Alicia MD  Authorized by: Natalie Alicia MD    Spinal Block  Patient position: sitting  Prep: Betadine  Patient monitoring: cardiac monitor, continuous pulse ox and frequent blood pressure checks  Approach: right paramedian  Location: L2/L3  Provider prep: mask and sterile gloves  Local infiltration: lidocaine  Needle  Needle type: Quincke   Needle gauge: 22 G  Needle length: 3.5 in  Assessment  Sensory level: T6  Events: cerebrospinal fluid  Swirl obtained: Yes (CSF aspirated)  CSF: clear  Attempts: 1  Hemodynamics: stable  Additional Notes  Free flowing CSF.  Negative heme.  Negative paresthesia.  .  Preanesthetic Checklist  Completed: patient identified, IV checked, site marked, risks and benefits discussed, surgical/procedural consents, equipment checked, pre-op evaluation, timeout performed, anesthesia consent given, oxygen available and monitors applied/VS acknowledged

## 2024-10-21 NOTE — H&P
History and physical is reviewed, patient re evaluated, no significant changes other than successful treatment of UTI a couple weeks ago. Most recent urine culture showed no growth 10/17/24.    Procedure, risks, benefits, and postoperative course were discussed with the patient and consent is reviewed.    Dr. Marcus

## 2024-10-21 NOTE — CARE COORDINATION
I met with patient and spouse.  HHC is recommended upon dc to home.  [place] of choice was given and they are interest in [facility].  I called and s/w Thais enriquez: in home therapy and faxed her the demographics.   #: [phone].  FAX:  [phone].

## 2024-10-22 VITALS
HEART RATE: 80 BPM | DIASTOLIC BLOOD PRESSURE: 71 MMHG | WEIGHT: 256 LBS | OXYGEN SATURATION: 98 % | TEMPERATURE: 98.6 F | RESPIRATION RATE: 18 BRPM | SYSTOLIC BLOOD PRESSURE: 141 MMHG | HEIGHT: 66 IN | BODY MASS INDEX: 41.14 KG/M2

## 2024-10-22 LAB
ANION GAP SERPL CALCULATED.3IONS-SCNC: 13 MEQ/L (ref 9–15)
BUN SERPL-MCNC: 13 MG/DL (ref 8–23)
CALCIUM SERPL-MCNC: 9.1 MG/DL (ref 8.5–9.9)
CHLORIDE SERPL-SCNC: 103 MEQ/L (ref 95–107)
CO2 SERPL-SCNC: 21 MEQ/L (ref 20–31)
CREAT SERPL-MCNC: 0.59 MG/DL (ref 0.5–0.9)
ERYTHROCYTE [DISTWIDTH] IN BLOOD BY AUTOMATED COUNT: 12.9 % (ref 11.7–14.4)
GLUCOSE BLD-MCNC: 212 MG/DL (ref 70–99)
GLUCOSE BLD-MCNC: 216 MG/DL (ref 70–99)
GLUCOSE SERPL-MCNC: 209 MG/DL (ref 70–99)
HCT VFR BLD AUTO: 31 % (ref 37–47)
HGB BLD-MCNC: 10.3 G/DL (ref 11.2–15.7)
MAGNESIUM SERPL-MCNC: 1.5 MG/DL (ref 1.7–2.4)
MCH RBC QN AUTO: 29.1 PG (ref 25.6–32.2)
MCHC RBC AUTO-ENTMCNC: 33.2 % (ref 32.2–35.5)
MCV RBC AUTO: 87.6 FL (ref 79.4–94.8)
PERFORMED ON: ABNORMAL
PERFORMED ON: ABNORMAL
PLATELET # BLD AUTO: 393 K/UL (ref 182–369)
POTASSIUM SERPL-SCNC: 3.5 MEQ/L (ref 3.4–4.9)
RBC # BLD AUTO: 3.54 M/UL (ref 3.93–5.22)
SODIUM SERPL-SCNC: 137 MEQ/L (ref 135–144)
WBC # BLD AUTO: 11.9 K/UL (ref 4–10)

## 2024-10-22 PROCEDURE — 97530 THERAPEUTIC ACTIVITIES: CPT

## 2024-10-22 PROCEDURE — 6370000000 HC RX 637 (ALT 250 FOR IP): Performed by: NURSE PRACTITIONER

## 2024-10-22 PROCEDURE — 2580000003 HC RX 258: Performed by: NURSE PRACTITIONER

## 2024-10-22 PROCEDURE — 97166 OT EVAL MOD COMPLEX 45 MIN: CPT

## 2024-10-22 PROCEDURE — 96375 TX/PRO/DX INJ NEW DRUG ADDON: CPT

## 2024-10-22 PROCEDURE — 83735 ASSAY OF MAGNESIUM: CPT

## 2024-10-22 PROCEDURE — 97535 SELF CARE MNGMENT TRAINING: CPT

## 2024-10-22 PROCEDURE — 97116 GAIT TRAINING THERAPY: CPT

## 2024-10-22 PROCEDURE — G0378 HOSPITAL OBSERVATION PER HR: HCPCS

## 2024-10-22 PROCEDURE — 36415 COLL VENOUS BLD VENIPUNCTURE: CPT

## 2024-10-22 PROCEDURE — 96374 THER/PROPH/DIAG INJ IV PUSH: CPT

## 2024-10-22 PROCEDURE — 85027 COMPLETE CBC AUTOMATED: CPT

## 2024-10-22 PROCEDURE — 97110 THERAPEUTIC EXERCISES: CPT

## 2024-10-22 PROCEDURE — 96376 TX/PRO/DX INJ SAME DRUG ADON: CPT

## 2024-10-22 PROCEDURE — 6360000002 HC RX W HCPCS: Performed by: NURSE PRACTITIONER

## 2024-10-22 PROCEDURE — 80048 BASIC METABOLIC PNL TOTAL CA: CPT

## 2024-10-22 PROCEDURE — 6370000000 HC RX 637 (ALT 250 FOR IP): Performed by: INTERNAL MEDICINE

## 2024-10-22 RX ORDER — SENNA AND DOCUSATE SODIUM 50; 8.6 MG/1; MG/1
1 TABLET, FILM COATED ORAL 2 TIMES DAILY
Qty: 60 TABLET | Refills: 0 | Status: SHIPPED | OUTPATIENT
Start: 2024-10-22 | End: 2024-11-21

## 2024-10-22 RX ORDER — OXYCODONE AND ACETAMINOPHEN 5; 325 MG/1; MG/1
1 TABLET ORAL EVERY 6 HOURS PRN
Qty: 28 TABLET | Refills: 0 | Status: SHIPPED | OUTPATIENT
Start: 2024-10-22 | End: 2024-10-29

## 2024-10-22 RX ORDER — ONDANSETRON 4 MG/1
4 TABLET, ORALLY DISINTEGRATING ORAL EVERY 8 HOURS PRN
Qty: 6 TABLET | Refills: 0 | Status: SHIPPED | OUTPATIENT
Start: 2024-10-22 | End: 2024-10-24

## 2024-10-22 RX ORDER — CYCLOBENZAPRINE HCL 10 MG
10 TABLET ORAL 3 TIMES DAILY PRN
Qty: 15 TABLET | Refills: 0 | Status: SHIPPED | OUTPATIENT
Start: 2024-10-22 | End: 2024-10-27

## 2024-10-22 RX ORDER — ASPIRIN 81 MG/1
81 TABLET ORAL 2 TIMES DAILY
Qty: 70 TABLET | Refills: 0 | Status: SHIPPED | OUTPATIENT
Start: 2024-10-22 | End: 2024-11-26

## 2024-10-22 RX ORDER — CEPHALEXIN 500 MG/1
500 CAPSULE ORAL 4 TIMES DAILY
Qty: 28 CAPSULE | Refills: 0 | Status: SHIPPED | OUTPATIENT
Start: 2024-10-22 | End: 2024-10-29

## 2024-10-22 RX ORDER — CELECOXIB 200 MG/1
200 CAPSULE ORAL DAILY
Qty: 14 CAPSULE | Refills: 0 | Status: SHIPPED | OUTPATIENT
Start: 2024-10-22 | End: 2024-11-05

## 2024-10-22 RX ADMIN — ONDANSETRON 4 MG: 2 INJECTION, SOLUTION INTRAMUSCULAR; INTRAVENOUS at 08:24

## 2024-10-22 RX ADMIN — TRANEXAMIC ACID 1950 MG: 650 TABLET ORAL at 05:49

## 2024-10-22 RX ADMIN — PANTOPRAZOLE SODIUM 40 MG: 40 TABLET, DELAYED RELEASE ORAL at 05:49

## 2024-10-22 RX ADMIN — DULOXETINE HYDROCHLORIDE 60 MG: 30 CAPSULE, DELAYED RELEASE ORAL at 09:13

## 2024-10-22 RX ADMIN — HYDROXYZINE HYDROCHLORIDE 10 MG: 10 TABLET ORAL at 09:14

## 2024-10-22 RX ADMIN — VALACYCLOVIR HYDROCHLORIDE 500 MG: 500 TABLET, FILM COATED ORAL at 09:14

## 2024-10-22 RX ADMIN — METOCLOPRAMIDE 10 MG: 10 TABLET ORAL at 09:13

## 2024-10-22 RX ADMIN — KETOROLAC TROMETHAMINE 7.5 MG: 15 INJECTION, SOLUTION INTRAMUSCULAR; INTRAVENOUS at 12:12

## 2024-10-22 RX ADMIN — AMLODIPINE BESYLATE 5 MG: 5 TABLET ORAL at 09:14

## 2024-10-22 RX ADMIN — ASPIRIN 81 MG: 81 TABLET, COATED ORAL at 09:13

## 2024-10-22 RX ADMIN — FENOFIBRATE 160 MG: 160 TABLET ORAL at 09:13

## 2024-10-22 RX ADMIN — HYDROXYZINE HYDROCHLORIDE 10 MG: 10 TABLET ORAL at 12:11

## 2024-10-22 RX ADMIN — NADOLOL 80 MG: 40 TABLET ORAL at 09:13

## 2024-10-22 RX ADMIN — ALPRAZOLAM 0.5 MG: 0.5 TABLET ORAL at 02:18

## 2024-10-22 RX ADMIN — INSULIN LISPRO 2 UNITS: 100 INJECTION, SOLUTION INTRAVENOUS; SUBCUTANEOUS at 09:14

## 2024-10-22 RX ADMIN — CEFAZOLIN 2000 MG: 1 INJECTION, POWDER, FOR SOLUTION INTRAMUSCULAR; INTRAVENOUS at 02:23

## 2024-10-22 RX ADMIN — ACETAMINOPHEN 650 MG: 325 TABLET ORAL at 09:13

## 2024-10-22 RX ADMIN — ACETAMINOPHEN 650 MG: 325 TABLET ORAL at 02:18

## 2024-10-22 RX ADMIN — KETOROLAC TROMETHAMINE 7.5 MG: 15 INJECTION, SOLUTION INTRAMUSCULAR; INTRAVENOUS at 05:48

## 2024-10-22 RX ADMIN — INSULIN LISPRO 2 UNITS: 100 INJECTION, SOLUTION INTRAVENOUS; SUBCUTANEOUS at 12:13

## 2024-10-22 RX ADMIN — METOCLOPRAMIDE 10 MG: 10 TABLET ORAL at 12:11

## 2024-10-22 RX ADMIN — METFORMIN HYDROCHLORIDE 1000 MG: 500 TABLET, EXTENDED RELEASE ORAL at 09:13

## 2024-10-22 RX ADMIN — GABAPENTIN 600 MG: 300 CAPSULE ORAL at 09:13

## 2024-10-22 ASSESSMENT — PAIN SCALES - GENERAL
PAINLEVEL_OUTOF10: 5
PAINLEVEL_OUTOF10: 3
PAINLEVEL_OUTOF10: 7
PAINLEVEL_OUTOF10: 8
PAINLEVEL_OUTOF10: 3
PAINLEVEL_OUTOF10: 8

## 2024-10-22 ASSESSMENT — PAIN DESCRIPTION - DESCRIPTORS
DESCRIPTORS: ACHING

## 2024-10-22 ASSESSMENT — PAIN DESCRIPTION - ORIENTATION
ORIENTATION: RIGHT

## 2024-10-22 ASSESSMENT — PAIN DESCRIPTION - LOCATION
LOCATION: HIP

## 2024-10-22 NOTE — DISCHARGE SUMMARY
Discharge summary  This patient Twyla Loyola was admitted to the hospital on 10/21/2024  after undergoing Procedure(s) (LRB):  Right posterior total hip replacement with Luis A robotic assistance,Aurelia wayne, 2 assistants, Sky NICOLE (Right) without complications that morning.    During the postoperative period,while in hospital, patient was medically managed by the hospitalist. Please see medial notes and H&P for patients additional diagnoses.  Ortho agrees with all medical diagnoses and treatments while patient in hospital.  No significant or unexpected findings or abnormal ortho imaging were noted during the hospital stay    Hospital course  Patient tolerated surgical procedure well and there was no complications. Patient progressed adequtly through their recovery during hospital stay including PT and rehabilitation.  DVT prophylaxis was implemented POD#1  Patient was then D/C on  to Home  in stable condition.  Patient was instructed on the use of pain medications, the signs and symptoms of infection, and was given our number to call should they have any questions or concerns following discharge.

## 2024-10-22 NOTE — DISCHARGE INSTR - COC
Z96.641       Isolation/Infection:   Isolation            No Isolation          Patient Infection Status       None to display            Nurse Assessment:  Last Vital Signs: BP (!) 155/77   Pulse 78   Temp 98.6 °F (37 °C) (Oral)   Resp 18   Ht 1.676 m (5' 6\")   Wt 116.1 kg (256 lb)   SpO2 98%   BMI 41.32 kg/m²     Last documented pain score (0-10 scale): Pain Level: 8  Last Weight:   Wt Readings from Last 1 Encounters:   10/21/24 116.1 kg (256 lb)     Mental Status:  {IP PT MENTAL STATUS:20030}    IV Access:  { EDILBERTO IV ACCESS:232231917}    Nursing Mobility/ADLs:  Walking   {CHP DME ADLs:304448967}  Transfer  {P DME ADLs:403181047}  Bathing  {CHP DME ADLs:812072666}  Dressing  {CHP DME ADLs:814548504}  Toileting  {CHP DME ADLs:650153613}  Feeding  {P DME ADLs:414883260}  Med Admin  {P DME ADLs:062028701}  Med Delivery   { EDILBERTO MED Delivery:941438959}    Wound Care Documentation and Therapy:  Incision 10/21/24 Hip Right (Active)   Dressing Status Clean;Dry;Intact 10/22/24 0821   Dressing/Treatment Silver dressing 10/22/24 0821   Drainage Amount None (dry) 10/22/24 0821   Number of days: 1        Elimination:  Continence:   Bowel: {YES / NO:19727}  Bladder: {YES / NO:19727}  Urinary Catheter: {Urinary Catheter:316180308}   Colostomy/Ileostomy/Ileal Conduit: {YES / NO:19727}       Date of Last BM: ***    Intake/Output Summary (Last 24 hours) at 10/22/2024 1113  Last data filed at 10/22/2024 0821  Gross per 24 hour   Intake 950 ml   Output 500 ml   Net 450 ml     I/O last 3 completed shifts:  In: 800 [I.V.:700; IV Piggyback:100]  Out: 500 [Blood:500]    Safety Concerns:     { EDILBERTO Safety Concerns:896701602}    Impairments/Disabilities:      { EDILBERTO Impairments/Disabilities:040161159}    Nutrition Therapy:  Current Nutrition Therapy:   { EDILBERTO Diet List:917635859}    Routes of Feeding: {CHP DME Other Feedings:201056273}  Liquids: {Slp liquid thickness:67390}  Daily Fluid Restriction: {CHIOAM BREWSTER Yes amt

## 2024-10-22 NOTE — PROGRESS NOTES
0995 Dr. Alicia anesthesia at bedside. Timeour completed for PENG block and spinal with Dr. Alicia, patient and this RN.  3107 Procedure complete. Pt jean marie well.VS obtained, see EMR.  
AVS reviewed, pt seemed understanding. All questions answered. Saline locked removed prior to discharge. All belongings gathered by pt. PT will leave unit via w/c.     
Abductor pillow inplace  
Department of Orthopedic Surgery  Attending Progress Note      SUBJECTIVE  No acute events overnight. Reports feeling warm this am, temp in room was turned down. Also having some nausea. Reports right thigh and groin pain. Denies neurological complaints. Denies SOB/CP    OBJECTIVE    Physical    VITALS:  BP (!) 155/77   Pulse 78   Temp 98.6 °F (37 °C) (Oral)   Resp 18   Ht 1.676 m (5' 6\")   Wt 116.1 kg (256 lb)   SpO2 98%   BMI 41.32 kg/m²     Awake, alert  Nonlabored breathing  Abdomen soft    RLE:  Dressings to right hip C/D/I  Moderate swelling  Sensation intact to light touch deep peroneal / superficial peroneal / tibial / saphenous / sural nerve distributions  Intact EHL/FHL/TA/G, knee extension and flexion motor function  Toes warm and well perfused, brisk capillary refill    Data    CBC:   Lab Results   Component Value Date/Time    WBC 11.9 10/22/2024 05:26 AM    RBC 3.54 10/22/2024 05:26 AM    HGB 10.3 10/22/2024 05:26 AM    HCT 31.0 10/22/2024 05:26 AM    MCV 87.6 10/22/2024 05:26 AM    MCH 29.1 10/22/2024 05:26 AM    MCHC 33.2 10/22/2024 05:26 AM    RDW 12.9 10/22/2024 05:26 AM     10/22/2024 05:26 AM     BMP:    Lab Results   Component Value Date/Time     10/22/2024 05:26 AM    K 3.5 10/22/2024 05:26 AM     10/22/2024 05:26 AM    CO2 21 10/22/2024 05:26 AM    BUN 13 10/22/2024 05:26 AM    CREATININE 0.59 10/22/2024 05:26 AM    CALCIUM 9.1 10/22/2024 05:26 AM    LABGLOM >90.0 10/22/2024 05:26 AM    GLUCOSE 209 10/22/2024 05:26 AM     Magnesium:    Lab Results   Component Value Date/Time    MG 1.5 10/22/2024 05:26 AM       ASSESSMENT AND PLAN      -POD # 1 following Right total hip replacement posterior approach with Luis A robotic assistance:  Continue to mobilize with PT/OT, full weightbearing RLE, walker for assist, posterior hip precautions.  Dressing to remain in place.    -VTE prophylaxis:  Chemoprophylaxis with ASA 81 mg BID.  Mechanical prophylaxis to incorporate SCDs and 
Facility/Department: [facility] MED SURG UNIT  Occupational Therapy Initial Assessment    Name: Twyla Loyola  : 1965  MRN: [id number]  Date of Service: 10/22/2024    Discharge Recommendations:  Continue to assess pending progress, Home with Home health OT, Home with assist PRN          Patient Diagnosis(es): R WERNER  Past Medical History:  has no past medical history on file.  Past Surgical History:  has a past surgical history that includes Total hip arthroplasty (Right, 10/21/2024).    Treatment Diagnosis: Decreased ADL/IADL performance d/t R WERNER      Assessment  Performance deficits / Impairments: Decreased functional mobility ;Decreased endurance;Decreased ADL status;Decreased sensation;Decreased balance;Decreased safe awareness;Decreased high-level IADLs  Assessment: Pt is a 60 y/o F admitted to [facility] for elective R WERNER with Dr. Marcus on 10/21/24. PLOF pt is from home with spouse, recently moved into one level home, she uses a cane or rolling walker for mobility. Pt was I with ADLs/IADLs prior to surgery. OT eval completed with pt limited by nausea and residual tingling in RLE, opted for sponge bath instead. Pt was educated on tub transfer and LB AE for home, states her sister has ordered it for her. Anticipate pt will return home with [facility] later this date pending progress with physical therapy.  Treatment Diagnosis: Decreased ADL/IADL performance d/t R WERNER  REQUIRES OT FOLLOW-UP: Yes  Activity Tolerance  Activity Tolerance: Patient limited by pain     Plan  Occupational Therapy Plan  Times Per Week: 4-7  Times Per Day: Once a day  Current Treatment Recommendations: Strengthening, ROM, Balance training, Safety education & training, Self-Care / ADL, Functional mobility training, Patient/Caregiver education & training, Endurance training, Equipment evaluation, education, & procurement, Positioning    Restrictions  Restrictions/Precautions  Restrictions/Precautions: Weight Bearing, Surgical Protocols  Required 
Facility/Department: [facility] UNIT  Physical Therapy Initial Assessment    Name: Twyla Loyola  : 1965  MRN: [id number]  Date of Service: 10/21/2024    Discharge Recommendations:  Continue to assess pending progress, Home with Home health PT          Patient Diagnosis(es): weakness, difficulty walking S/P R MAYRA  Past Medical History:  has no past medical history on file.  Past Surgical History:  has no past surgical history on file.    Assessment  Body Structures, Functions, Activity Limitations Requiring Skilled Therapeutic Intervention: Decreased functional mobility ;Decreased strength;Increased pain;Decreased safe awareness;Decreased ROM  Assessment: 59 year old female adm to [facility] for elective R MAYRA currently requires assist with bed mobility, transfers and amb. Pt would benefit from skilled PT treatment while hospitalized to address impairments, improve transfers, ambulation and overall functional mobility to allow safe D/C home.  Treatment Diagnosis: weakness, difficulty walking S/P MAYRA R  Therapy Prognosis: Good  Requires PT Follow-Up: Yes    Plan  Physical Therapy Plan  General Plan: 5-7 times per week (1-2 x per day)  Current Treatment Recommendations: Strengthening, Transfer training, Functional mobility training, Gait training, Patient/Caregiver education & training, Therapeutic activities, Positioning, Safety education & training (Mayra protocol)    Restrictions  Restrictions/Precautions  Restrictions/Precautions: Weight Bearing, Surgical Protocols  Required Braces or Orthoses?: Yes (Abd pillow)  Implants present? : Metal implants (history of lumbar spinal fusion- plates and screws)  Position Activity Restriction  Hip Precautions: Posterior hip precautions     Subjective  General  Additional Pertinent Hx: has POTS balance impairments- diagnosed in  with POTS, history of chronic Bilat hip pain elected for R MAYRA, plans for L MAYRA in 6-8 wks  Diagnosis: S/P R MAYRA  Subjective  Subjective: Pt 
Ice chips given  
Physical Therapy  Facility/Department: [facility] MED SURG UNIT  Daily Treatment Note  NAME: Twyla Loyola  : 1965  MRN: [id number]    Date of Service: 10/22/2024    Discharge Recommendations:  (P) Continue to assess pending progress, Home with Home health PT        Patient Diagnosis(es): The encounter diagnosis was Status post total hip replacement, right.    Assessment  Assessment: (P) Pt. tolerates ambulating limited distance with FWW ~ 40' twice. Pt. slow and steady with step to gait pattern. Pt. limited to weight bear on R LE due to c/o 8/10 R hip pain. Pt. needs one vc out of 3 sit to stands to improve proper hand placement for control and safety. Pt. needs min assist with bed mobility with multiple vc's. Pt. tolerates supine ther ex for B LE circulation and R LE ROM and strength. Pt. education on self positioning for comfort and to reduce risk of hip flexion contracture due to pt requesting to keep hips flexed at rest.  Activity Tolerance: (P) Patient limited by pain    Plan  Physical Therapy Plan  General Plan: (P) 5-7 times per week (1-2)  Current Treatment Recommendations: (P) Strengthening;Transfer training;Functional mobility training;Gait training;Patient/Caregiver education & training;Therapeutic activities;Positioning;Safety education & training    Restrictions  Restrictions/Precautions  Restrictions/Precautions: Weight Bearing, Surgical Protocols  Required Braces or Orthoses?: No  Implants present? : Metal implants (Spinal lumbar surgery, R WERNER)  Lower Extremity Weight Bearing Restrictions  Right Lower Extremity Weight Bearing: Weight Bearing As Tolerated  Position Activity Restriction  Hip Precautions: Posterior hip precautions     Subjective   Subjective  Subjective: Pt. c/o 8/10 R hip pain.  Pain: 4/10 R hip  Orientation  Overall Orientation Status: Within Functional Limits  Orientation Level: Oriented X4  Cognition  Overall Cognitive Status: WFL  Cognition Comment: Alert, pleasant, cooperative, 
Pt arrived in pacu semi awake answering qustions speaking with Dr Marcus  dressing clean and dry b able to move toes xray notified ice applies to the right hip able to move toes no distress noted  
Pt transferred to unit from surgery with a total R hip replacement. Aquacel CDI, abductor pillow and IPC device attached. Pt is numb from the waist down. No c/o pain. A&o x4, NS running continuously at 100 ml/hr. Skin assessment completed. Lungs clear. Pt is pleasant and content with  at bedside, call light within reach.   
Sofie Faye Initial Discharge Assessment    Met with Patient to discuss discharge plan.        PCP: Leola Rutledge, DO                                  Date of Last Visit: \" a month ago\"    If no PCP, list provided? N/A    Discharge Planning    Living Arrangements:  Patient reports residing at home with spouse       Who helps you with your care:  self or spouse    If lives at home:     Do you have any barriers navigating in your home? no    Patient can perform ADL?  Yes- Patient's spouse is reported to assist with patient's care needs    Current Services (outpatient and in home) :  None    Dialysis: No    Is transportation available to get to your appointments? Yes    DME Equipment:  yes - walker, cane, rollator, shower chair, walk-in shower, grab bars, and raised toilet    Respiratory equipment: None    Respiratory provider:  no     Pharmacy:  yes - [facility] in [place]     Able to afford cost of medication: Yes    Does patient feel safe at home? Yes    Does patient identify any home going needs? Yes, home health     Patient agreeable to HHC?      Yes, [facility]     Patient agreeable to SNF/Rehab?     N/A    Other discharge needs identified?      N/A    Was Freedom of Choice Provided?      Yes    Initial Discharge Plan? (Note: please see concurrent daily documentation for any updates after initial note).      Chart reviewed.  Patient was admitted to [facility] under observation status post right hip replacement.  Patient's care discussed in daily quality rounds.  Patient was evaluated by PT/OT and therapies recommending HHC upon DC.    This  met with patient to discuss DC planning and help at home needs.  Upon visit patient is alert and sitting up in recliner chair.  Patient is alert and oriented to person, place, and situation.  Mood is calm and cooperative.  Patient reports plan to return home with spouse with family taking turns staying with patient to assist with care needs.  Patient 
Xray here    
Verbal cues (vc's on technique and safety awareness.)  Rolling: Stand-by assistance  Supine to Sit: (P) Stand-by assistance  Sit to Supine: (P) Minimum assistance;Contact-guard assistance  Scooting: Stand-by assistance (very small segment movement with c/o R hip pain.,)  Balance  Sitting: Intact  Standing: Intact;With support (FWW used SBA)  Transfer Training  Transfer Training: Yes  Overall Level of Assistance: Contact-guard assistance  Interventions: Safety awareness training;Verbal cues  Sit to Stand: Contact-guard assistance;Stand-by assistance  Stand to Sit: Contact-guard assistance;Stand-by assistance  Stand Pivot Transfers: (P) Contact-guard assistance  Toilet Transfer: Contact-guard assistance  Tub/Shower Transfer:  (Educated pt and spouse on tub transfer for home and provided demonstration.)  Gait Training  Gait Training: (P) No  Gait  Gait Training: (P) No  Distance (ft): 38 Feet (x2)  Assistive Device: Walker, rolling  Speed/Ann: Delayed;Slow  Stance: Right decreased  Gait Abnormalities: Step to gait (Increase UE weight bear on walker.)     PT Exercises  Exercise Treatment: HEP handout educated and issued.  A/AROM Exercises: (P) Seated ther ex B LE: LAQ x 10'. Supine ther ex B LE: Heelslides AAROM x 10', Hip abduction/adduction within 5-10 degs, SLR x 10' AAROM. Low tolerance due to pain. Pt. education and encouragement provided with technique and slight manual assist to complete 10 reps each with R LE.  Other Specialty Interventions  Other Treatments/Modalities: (P) Pt. educated on postural awareness with equal weight shift to reduce R hip guarding during sit and supine position.  Safety Devices  Type of Devices: Patient at risk for falls;Left in chair;Call light within reach;Gait belt      Goals  Short Term Goals  Time Frame for Short Term Goals: 3-5 days  Short Term Goal 1: Bed mobility with SBA  Short Term Goal 2: Transfers with supervision  Short Term Goal 3: Pt amb 75 feet with ww SBA  Short

## 2024-10-22 NOTE — CONSULTS
Consult      Patient:  Twyla Loyola  YOB: 1965    MRN: [id number]     Acct: [id number]    Primary Care Physician: Leola Rutledge DO    HISTORY OF PRESENT ILLNESS:   History obtained from chart review and the patient.    The patient is a 59 y.o. female whom I have been requested to see by Dr. Starr for evaluation of HTN/medical management. Patient has long standing R hip OA and came for scheduled hip replacement, was evaluated in POD x1. Patient has extensive medical problems including HTN, HLP, DM, lichen planus, POTS, obesity, anxiety, depression. Compliant with medical treatment and follow up. Denied dyspnea/CP     Past Medical History:  History reviewed. No pertinent past medical history.    Past Surgical History:        Procedure Laterality Date    TOTAL HIP ARTHROPLASTY Right 10/21/2024    Right posterior total hip replacement with Luis A robotic assistance,[place] insignia, 2 assistants, Sky NICOLE aware performed by Denis Marcus MD at [facility] OR       Medications:    No current facility-administered medications on file prior to encounter.     Current Outpatient Medications on File Prior to Encounter   Medication Sig Dispense Refill    insulin NPH (HUMULIN N;NOVOLIN N) 100 UNIT/ML injection vial Inject 10 Units into the skin nightly      DULoxetine (CYMBALTA) 60 MG extended release capsule Take 1 capsule by mouth daily      vitamin D (ERGOCALCIFEROL) 1.25 MG (38832 UT) CAPS capsule Take 1 capsule by mouth      eletriptan (RELPAX) 40 MG tablet Take 1 tablet by mouth as needed      vitamin D (ERGOCALCIFEROL) 1.25 MG (62992 UT) CAPS capsule Take 1 capsule by mouth Once a week at 5 PM      fenofibrate (TRICOR) 145 MG tablet Take 1 tablet by mouth daily      gabapentin (NEURONTIN) 300 MG capsule TAKE 2 CAPSULES BY MOUTH ONCE DAILY IN THE MORNING, 2 CAPSULES ONCE DAILY IN THE AFTERNOON, THEN TAKE 3 CAPSULES AT NIGHT      hydroxychloroquine (PLAQUENIL) 200 MG tablet Take 1 tablet by

## 2024-10-25 ASSESSMENT — ENCOUNTER SYMPTOMS
GASTROINTESTINAL NEGATIVE: 1
RESPIRATORY NEGATIVE: 1
EYES NEGATIVE: 1

## 2024-10-25 NOTE — PROGRESS NOTES
Twyla Loyola (:  1965) is a 59 y.o. female,Established patient, here for evaluation of the following chief complaint(s):  Follow-up (Patient presents to clinic today go over lab results for surgery. DOS: 10/21/2024 with Dr. Marcus.)         Assessment & Plan  Pre-op exam            Cystitis              No follow-ups on file.       Subjective   This is a 59-year-old female following up to review her urine culture results prior to her hip replacement with Dr. Marcus 2024.  Patient denies any dysuria.        Review of Systems   Constitutional: Negative.    HENT: Negative.     Eyes: Negative.    Respiratory: Negative.     Gastrointestinal: Negative.    Genitourinary: Negative.    Musculoskeletal: Negative.    Psychiatric/Behavioral: Negative.            Objective   Physical Exam  Abdominal:      Comments: Abdomen-nontender with palpation.  No suprapubic region pain.     Urine analysis 2024 showed greater than 100 white cells in her urine.  Most recent urine analysis performed 4 days prior shows 3-5 white cells.  Patient has completed 14 days of Cipro 500 mg tablets twice daily.       On this date 10/17/2024 I have spent 30 minutes reviewing previous notes, test results and face to face with the patient discussing the diagnosis and importance of compliance with the treatment plan as well as documenting on the day of the visit.      An electronic signature was used to authenticate this note.    --JONG Kaur

## 2024-11-11 ENCOUNTER — OFFICE VISIT (OUTPATIENT)
Dept: ORTHOPEDIC SURGERY | Age: 59
End: 2024-11-11
Payer: MEDICARE

## 2024-11-11 ENCOUNTER — HOSPITAL ENCOUNTER (OUTPATIENT)
Dept: ORTHOPEDIC SURGERY | Age: 59
Discharge: HOME | End: 2024-11-13
Payer: MEDICARE

## 2024-11-11 VITALS — HEIGHT: 66 IN | WEIGHT: 256 LBS | BODY MASS INDEX: 41.14 KG/M2

## 2024-11-11 DIAGNOSIS — Z96.641 S/P HIP REPLACEMENT, RIGHT: ICD-10-CM

## 2024-11-11 DIAGNOSIS — M19.011 PRIMARY OSTEOARTHRITIS OF RIGHT SHOULDER: Primary | ICD-10-CM

## 2024-11-11 DIAGNOSIS — M25.511 ACUTE PAIN OF RIGHT SHOULDER: ICD-10-CM

## 2024-11-11 PROCEDURE — 73502 X-RAY EXAM HIP UNI 2-3 VIEWS: CPT

## 2024-11-11 PROCEDURE — 1036F TOBACCO NON-USER: CPT | Performed by: STUDENT IN AN ORGANIZED HEALTH CARE EDUCATION/TRAINING PROGRAM

## 2024-11-11 PROCEDURE — 3017F COLORECTAL CA SCREEN DOC REV: CPT | Performed by: STUDENT IN AN ORGANIZED HEALTH CARE EDUCATION/TRAINING PROGRAM

## 2024-11-11 PROCEDURE — 73030 X-RAY EXAM OF SHOULDER: CPT

## 2024-11-11 PROCEDURE — G8427 DOCREV CUR MEDS BY ELIG CLIN: HCPCS | Performed by: STUDENT IN AN ORGANIZED HEALTH CARE EDUCATION/TRAINING PROGRAM

## 2024-11-11 PROCEDURE — 99214 OFFICE O/P EST MOD 30 MIN: CPT | Performed by: STUDENT IN AN ORGANIZED HEALTH CARE EDUCATION/TRAINING PROGRAM

## 2024-11-11 PROCEDURE — G8484 FLU IMMUNIZE NO ADMIN: HCPCS | Performed by: STUDENT IN AN ORGANIZED HEALTH CARE EDUCATION/TRAINING PROGRAM

## 2024-11-11 PROCEDURE — G8417 CALC BMI ABV UP PARAM F/U: HCPCS | Performed by: STUDENT IN AN ORGANIZED HEALTH CARE EDUCATION/TRAINING PROGRAM

## 2024-11-11 NOTE — PROGRESS NOTES
Subjective:      HPI:: Twyla Loyola is a 59 y.o. female who presents today for 2 separate things.  She is about 3 weeks status post right total hip replacement with Luis A robotic assistance.  She reports she is making progress in her recovery.  She has been using a walker.  She is doing home therapy.  She has taken aspirin for DVT prophylaxis.  Separate from the hip replacement she complains of pain of the right shoulder which she reports is excruciating.  It is worse since a fall that she reports occurred on October 12.  She has difficulty with overhead activity.  She denies neurologic complaints.  Denies any neck pain.    No past medical history on file.  Past Surgical History:   Procedure Laterality Date    TOTAL HIP ARTHROPLASTY Right 10/21/2024    Right posterior total hip replacement with Luis A robotic assistance,Aureliatalisha wayne, 2 assistants, LUIS A,Sky Loo aware performed by Denis Marcus MD at [facility] OR     Social History     Socioeconomic History    Marital status:      Spouse name: Not on file    Number of children: Not on file    Years of education: Not on file    Highest education level: Not on file   Occupational History    Not on file   Tobacco Use    Smoking status: Never    Smokeless tobacco: Never   Substance and Sexual Activity    Alcohol use: Never    Drug use: Never    Sexual activity: Not on file   Other Topics Concern    Not on file   Social History Narrative    Not on file     Social Determinants of Health     Financial Resource Strain: Not on file   Food Insecurity: Not on file   Transportation Needs: Not on file   Physical Activity: Not on file   Stress: Not on file   Social Connections: Not on file   Intimate Partner Violence: Not on file   Housing Stability: Not on file     No family history on file.  Allergies   Allergen Reactions    Doxepin Other (See Comments)    Morphine Other (See Comments)    Tramadol Other (See Comments)    Tramadol Hcl Hives     Muscle pain in legs

## 2024-12-09 ENCOUNTER — OFFICE VISIT (OUTPATIENT)
Dept: ORTHOPEDIC SURGERY | Age: 59
End: 2024-12-09

## 2024-12-09 VITALS
HEART RATE: 73 BPM | HEIGHT: 66 IN | TEMPERATURE: 97.1 F | WEIGHT: 256 LBS | BODY MASS INDEX: 41.14 KG/M2 | OXYGEN SATURATION: 98 %

## 2024-12-09 DIAGNOSIS — Z96.641 S/P HIP REPLACEMENT, RIGHT: Primary | ICD-10-CM

## 2024-12-09 PROCEDURE — 99024 POSTOP FOLLOW-UP VISIT: CPT | Performed by: STUDENT IN AN ORGANIZED HEALTH CARE EDUCATION/TRAINING PROGRAM

## 2024-12-09 RX ORDER — FLUCONAZOLE 100 MG/1
TABLET ORAL
COMMUNITY
Start: 2024-10-11

## 2024-12-09 RX ORDER — CLOBETASOL PROPIONATE 0.5 MG/G
CREAM TOPICAL
COMMUNITY

## 2024-12-09 RX ORDER — CYCLOBENZAPRINE HCL 10 MG
10 TABLET ORAL 3 TIMES DAILY PRN
COMMUNITY

## 2024-12-09 RX ORDER — ATORVASTATIN CALCIUM 20 MG/1
20 TABLET, FILM COATED ORAL DAILY
COMMUNITY
Start: 2024-09-07

## 2024-12-09 NOTE — PROGRESS NOTES
Subjective:      HPI:: Twyla Loyola is a 59 y.o. female who presents today for follow-up status post right total hip replacement.  She is now about 6-7 weeks postop.  She reports she is still has some continued pain to the right lower back and buttock area.  The pain seems different than what she was having prior to surgery.  She does have a history of chronic lower back pain and prior back surgery.  She denies any specific traumatic event or injury.  She is ambulatory using a cane at a minimum and most of the time when she is out of the house using a walker.    No past medical history on file.  Past Surgical History:   Procedure Laterality Date    TOTAL HIP ARTHROPLASTY Right 10/21/2024    Right posterior total hip replacement with Luis A robotic assistance,[place] insignia, 2 assistants, Sky NICOLE aware performed by Denis Marcus MD at [facility] OR     Social History     Socioeconomic History    Marital status:      Spouse name: Not on file    Number of children: Not on file    Years of education: Not on file    Highest education level: Not on file   Occupational History    Not on file   Tobacco Use    Smoking status: Never    Smokeless tobacco: Never   Substance and Sexual Activity    Alcohol use: Never    Drug use: Never    Sexual activity: Not on file   Other Topics Concern    Not on file   Social History Narrative    Not on file     Social Determinants of Health     Financial Resource Strain: Not on file   Food Insecurity: Not on file   Transportation Needs: Not on file   Physical Activity: Not on file   Stress: Not on file   Social Connections: Not on file   Intimate Partner Violence: Not on file   Housing Stability: Not on file     No family history on file.  Allergies   Allergen Reactions    Meperidine Nausea And Vomiting and Other (See Comments)     stops breathing    Other Reaction(s): GI Upset, stopped breathing    Doxepin Other (See Comments)    Meperidine Hcl      Other Reaction(s):

## 2025-01-07 DIAGNOSIS — M54.31 SCIATICA OF RIGHT SIDE: Primary | ICD-10-CM

## 2025-01-07 DIAGNOSIS — Z98.1 HISTORY OF LUMBAR FUSION: ICD-10-CM

## 2025-01-13 DIAGNOSIS — G90.A POSTURAL ORTHOSTATIC TACHYCARDIA SYNDROME: ICD-10-CM

## 2025-01-13 DIAGNOSIS — G43.719 INTRACTABLE CHRONIC MIGRAINE WITHOUT AURA AND WITHOUT STATUS MIGRAINOSUS: ICD-10-CM

## 2025-01-13 RX ORDER — NADOLOL 80 MG/1
80 TABLET ORAL 2 TIMES DAILY
Qty: 60 TABLET | Refills: 0 | Status: SHIPPED | OUTPATIENT
Start: 2025-01-13

## 2025-01-15 DIAGNOSIS — G43.719 INTRACTABLE CHRONIC MIGRAINE WITHOUT AURA AND WITHOUT STATUS MIGRAINOSUS: Primary | ICD-10-CM

## 2025-01-15 RX ORDER — METHYLPREDNISOLONE 4 MG/1
TABLET ORAL
Qty: 21 TABLET | Refills: 0 | Status: SHIPPED | OUTPATIENT
Start: 2025-01-15 | End: 2025-01-21

## 2025-02-05 ENCOUNTER — HOSPITAL ENCOUNTER (OUTPATIENT)
Dept: ORTHOPEDIC SURGERY | Age: 60
Discharge: HOME | End: 2025-02-07
Payer: MEDICARE

## 2025-02-05 ENCOUNTER — OFFICE VISIT (OUTPATIENT)
Dept: ORTHOPEDIC SURGERY | Age: 60
End: 2025-02-05

## 2025-02-05 VITALS
WEIGHT: 256 LBS | SYSTOLIC BLOOD PRESSURE: 122 MMHG | DIASTOLIC BLOOD PRESSURE: 82 MMHG | OXYGEN SATURATION: 97 % | TEMPERATURE: 95.7 F | HEIGHT: 66 IN | HEART RATE: 74 BPM | BODY MASS INDEX: 41.14 KG/M2

## 2025-02-05 DIAGNOSIS — Z96.641 S/P HIP REPLACEMENT, RIGHT: ICD-10-CM

## 2025-02-05 DIAGNOSIS — Z96.641 S/P HIP REPLACEMENT, RIGHT: Primary | ICD-10-CM

## 2025-02-05 PROCEDURE — 73502 X-RAY EXAM HIP UNI 2-3 VIEWS: CPT

## 2025-02-12 ENCOUNTER — TELEMEDICINE (OUTPATIENT)
Dept: NEUROLOGY | Facility: CLINIC | Age: 60
End: 2025-02-12
Payer: MEDICARE

## 2025-02-12 DIAGNOSIS — G43.719 INTRACTABLE CHRONIC MIGRAINE WITHOUT AURA AND WITHOUT STATUS MIGRAINOSUS: Primary | ICD-10-CM

## 2025-02-12 DIAGNOSIS — G90.A POSTURAL ORTHOSTATIC TACHYCARDIA SYNDROME: ICD-10-CM

## 2025-02-12 PROCEDURE — 99214 OFFICE O/P EST MOD 30 MIN: CPT | Performed by: NURSE PRACTITIONER

## 2025-02-12 PROCEDURE — 99214 OFFICE O/P EST MOD 30 MIN: CPT | Mod: 95 | Performed by: NURSE PRACTITIONER

## 2025-02-12 RX ORDER — NADOLOL 80 MG/1
80 TABLET ORAL 2 TIMES DAILY
Qty: 180 TABLET | Refills: 3 | Status: SHIPPED | OUTPATIENT
Start: 2025-02-12

## 2025-02-12 NOTE — PROGRESS NOTES
Virtual or Telephone Consent    An interactive audio and video telecommunication system which permits real time communications between the patient (at the originating site) and provider (at the distant site) was utilized to provide this telehealth service.   Verbal consent was requested and obtained from Yumi Tai on this date, 02/12/25 for a telehealth visit.       Subjective   HPI  Yumi Tai is a 59 y.o. year old female who presents with chief complaint Intractable chronic migraine without aura and without status migrainosus [G43.719]    Yumi is experiencing  2-3 HA days per month, 2-3 of the HAs meet migraine criteria.   Triggers include barometric pressure .  The headaches are usually moderate, pounding, and pressure  and are located occiput or behind right eye, generally symmetric.   The patient rates the most severe headaches a 9 in intensity.   Generally, headaches last about 2 hours in duration.   Associated nausea, photophobia, and phonophobia.   The current rescue medications work within  2    hours and decreased the headache by 75%. The patient  does repeat treatment with rescue medication.  Patient reporting that January was a rough month with increased activity and she did have to take a Medrol Dosepak.  So far this month she has already had 2 migraines.  Suspect that it is due to the barometric pressure changes since that is a trigger for her.  Will continue to monitor to determine if we may need to adjust her treatment plan.  Patient in agreement.        Current/ past HA treatments:  Preventive:  Duloxetine  Gabapentin  Nadolol    Rescue:  Acetaminophen  Ibuprofen  Sumatriptan  Rizatriptan      Current Outpatient Medications:     ALPRAZolam (Xanax) 1 mg tablet, Take 1 tablet (1 mg) by mouth as needed at bedtime for anxiety., Disp: , Rfl:     atorvastatin (Lipitor) 10 mg tablet, Take 1 tablet (10 mg) by mouth once daily., Disp: , Rfl:     cloNIDine (Catapres) 0.1 mg tablet, Take 1  tablet (0.1 mg) by mouth once daily., Disp: 30 tablet, Rfl: 1    DULoxetine (Cymbalta) 60 mg DR capsule, Take 1 capsule (60 mg) by mouth once daily., Disp: , Rfl:     eletriptan (Relpax) 40 mg tablet, Take 1 tablet (40 mg) by mouth if needed for migraine. May repeat x 1 dose in 2 hours.  Max 80 mg/24 hours, Disp: 6 tablet, Rfl: 5    ergocalciferol (Vitamin D-2) 1.25 MG (98213 UT) capsule, Take 1 capsule (50,000 Units) by mouth 1 (one) time per week., Disp: , Rfl:     fenofibrate (Tricor) 145 mg tablet, Take 1 tablet (145 mg) by mouth once daily., Disp: , Rfl:     gabapentin (Neurontin) 300 mg capsule, Take 1 capsule (300 mg) by mouth 2 times a day., Disp: , Rfl:     insulin NPH, Isophane, (NovoLIN N NPH U-100 Insulin) 100 unit/mL injection, Inject 10 Units under the skin 2 times a day before meals., Disp: , Rfl:     levothyroxine (Tirosint) 50 mcg capsule, Take 1 capsule (50 mcg) by mouth once daily., Disp: , Rfl:     metFORMIN  mg 24 hr tablet, Take 2 tablets (1,000 mg) by mouth 2 times a day., Disp: , Rfl:     nadolol (Corgard) 80 mg tablet, Take 1 tablet (80 mg) by mouth 2 times a day., Disp: 60 tablet, Rfl: 0    omega-3 fatty acids-fish oil (Fish OiL) 360-1,200 mg capsule, Take 1 capsule (1,200 mg) by mouth once daily., Disp: , Rfl:     ondansetron (Zofran) 8 mg tablet, Take 1 tablet (8 mg) by mouth 1 time., Disp: , Rfl:     ondansetron ODT (Zofran-ODT) 8 mg disintegrating tablet, Take 1 tablet (8 mg) by mouth every 8 hours if needed for nausea., Disp: 20 tablet, Rfl: 5    tiZANidine (Zanaflex) 4 mg tablet, Take 1 tablet (4 mg) by mouth 3 times a day as needed for muscle spasms., Disp: 90 tablet, Rfl: 1    valACYclovir (Valtrex) 500 mg tablet, Take 1 tablet (500 mg) by mouth once daily., Disp: , Rfl:     Allergies   Allergen Reactions    Codeine Other    Demerol [Meperidine] Other    Morphine Other    Ultram [Tramadol] Other    Vicodin [Hydrocodone-Acetaminophen] Other       Past Medical History:    Diagnosis Date    Cervicalgia 04/09/2014    Cervicalgia    Generalized hyperhidrosis 04/27/2015    Generalized hyperhidrosis    Other disturbances of skin sensation 04/09/2014    Burning sensation    Personal history of pulmonary embolism     History of pulmonary embolism     Past Surgical History:   Procedure Laterality Date    BACK SURGERY  08/26/2013    Lower Back Surgery    BREAST SURGERY  08/26/2013    Breast Surgery Reduction Procedure    CT ANGIO NECK  3/29/2012    CT NECK ANGIO W AND WO IV CONTRAST 3/29/2012 [facility]    CT HEAD ANGIO W AND WO IV CONTRAST  3/29/2012    CT HEAD ANGIO W AND WO IV CONTRAST 3/29/2012 [facility]    HYSTERECTOMY  08/26/2013    Hysterectomy    MR HEAD ANGIO W IV CONTRAST  9/25/2014    MR HEAD ANGIO W IV CONTRAST 9/25/2014 [facility]     No family history on file.  Social History     Tobacco Use    Smoking status: Never    Smokeless tobacco: Never   Substance Use Topics    Alcohol use: Not on file     Social History     Substance and Sexual Activity   Drug Use Not on file        ROS  As noted in HPI, otherwise all other systems have been reviewed are negative for complaint.     Objective   General Appearance: Yumi is well-developed, well-nourished, 59 y.o. year old female, in no acute distress. Makes good eye contact, is alert, interactive, and cooperative. Demonstrates recent & remote memory recall. Subjective information consistent with objective assessment.       Lab Results   Component Value Date    WBC 6.7 03/09/2019    RBC 4.37 03/09/2019    HGB 12.5 03/09/2019    HCT 39.4 03/09/2019     03/09/2019     03/09/2019    K 3.7 03/09/2019     03/09/2019    BUN 9 03/09/2019    CREATININE 0.61 03/09/2019    CALCIUM 9.5 03/09/2019    MG 2.04 03/09/2019    HGBA1C 5.4 09/30/2024    TSH 1.18 03/08/2019          Assessment & Plan  Intractable chronic migraine without aura and without status migrainosus    Orders:    nadolol (Corgard)  80 mg tablet; Take 1 tablet (80 mg) by mouth 2 times a day.    Postural orthostatic tachycardia syndrome    Orders:    nadolol (Corgard) 80 mg tablet; Take 1 tablet (80 mg) by mouth 2 times a day.         ASSESSMENT/PLAN:  Continue nadolol for preventative treatment.  Continue Relpax for abortive treatment.  Continue tizanidine for muscle spasms.  Follow-up in 1 year or sooner if needed.          Sandra Lorenzo, APRN-CNP

## 2025-03-21 DIAGNOSIS — G43.719 INTRACTABLE CHRONIC MIGRAINE WITHOUT AURA AND WITHOUT STATUS MIGRAINOSUS: ICD-10-CM

## 2025-03-21 RX ORDER — ELETRIPTAN HYDROBROMIDE 40 MG/1
40 TABLET, FILM COATED ORAL AS NEEDED
Qty: 6 TABLET | Refills: 5 | Status: SHIPPED | OUTPATIENT
Start: 2025-03-21

## 2025-03-21 RX ORDER — TIZANIDINE 4 MG/1
4 TABLET ORAL 3 TIMES DAILY PRN
Qty: 90 TABLET | Refills: 1 | Status: SHIPPED | OUTPATIENT
Start: 2025-03-21

## 2025-03-28 LAB
NON-UH HIE A/G RATIO: 1.3
NON-UH HIE ALB: 4.1 G/DL (ref 3.4–5)
NON-UH HIE ALK PHOS: 84 UNIT/L (ref 45–117)
NON-UH HIE BILIRUBIN, TOTAL: 0.4 MG/DL (ref 0.3–1.2)
NON-UH HIE BUN/CREAT RATIO: 25
NON-UH HIE BUN: 20 MG/DL (ref 9–23)
NON-UH HIE CALCIUM: 10.3 MG/DL (ref 8.7–10.4)
NON-UH HIE CALCULATED OSMOLALITY: 280 MOSM/KG (ref 275–295)
NON-UH HIE CHLORIDE: 103 MMOL/L (ref 98–107)
NON-UH HIE CO2, VENOUS: 27 MMOL/L (ref 20–31)
NON-UH HIE CREATININE: 0.8 MG/DL (ref 0.5–0.8)
NON-UH HIE GFR AA: >60
NON-UH HIE GLOBULIN: 3.2 G/DL
NON-UH HIE GLOMERULAR FILTRATION RATE: >60 ML/MIN/1.73M?
NON-UH HIE GLUCOSE: 100 MG/DL (ref 74–106)
NON-UH HIE GOT: 23 UNIT/L (ref 15–37)
NON-UH HIE GPT: 25 UNIT/L (ref 10–49)
NON-UH HIE HGB A1C: 6.1 %
NON-UH HIE K: 4.6 MMOL/L (ref 3.5–5.1)
NON-UH HIE NA: 139 MMOL/L (ref 135–145)
NON-UH HIE TOTAL PROTEIN: 7.3 G/DL (ref 5.7–8.2)
NON-UH HIE TSH: 1.77 UIU/ML (ref 0.55–4.78)

## 2025-04-03 DIAGNOSIS — R20.0 FACIAL NUMBNESS: ICD-10-CM

## 2025-04-03 DIAGNOSIS — R51.9 WORSENING HEADACHES: Primary | ICD-10-CM

## 2025-07-17 ENCOUNTER — OFFICE VISIT (OUTPATIENT)
Age: 60
End: 2025-07-17

## 2025-07-17 DIAGNOSIS — M19.011 PRIMARY OSTEOARTHRITIS OF RIGHT SHOULDER: Primary | ICD-10-CM

## 2025-07-17 DIAGNOSIS — M75.51 CHRONIC BURSITIS OF RIGHT SHOULDER: ICD-10-CM

## 2025-07-17 RX ORDER — LIDOCAINE HYDROCHLORIDE 10 MG/ML
6 INJECTION, SOLUTION INFILTRATION; PERINEURAL ONCE
Status: COMPLETED | OUTPATIENT
Start: 2025-07-17 | End: 2025-07-17

## 2025-07-17 RX ORDER — TRIAMCINOLONE ACETONIDE 40 MG/ML
80 INJECTION, SUSPENSION INTRA-ARTICULAR; INTRAMUSCULAR ONCE
Status: COMPLETED | OUTPATIENT
Start: 2025-07-17 | End: 2025-07-17

## 2025-07-17 RX ADMIN — LIDOCAINE HYDROCHLORIDE 6 ML: 10 INJECTION, SOLUTION INFILTRATION; PERINEURAL at 16:36

## 2025-07-17 RX ADMIN — TRIAMCINOLONE ACETONIDE 80 MG: 40 INJECTION, SUSPENSION INTRA-ARTICULAR; INTRAMUSCULAR at 16:36

## 2025-07-17 NOTE — PROGRESS NOTES
Subjective:      HPI:: Twyla Loyola is a 59 y.o. female who presents today for follow-up evaluation of her right shoulder.  She is with her  today.  I have seen her previously for the shoulder after she had a fall with exacerbation of her chronic pain.  We had done an injection at the end the last year and this had given her significant relief.  Over time it seems like the injections worn off.  She denies any interval traumatic event or injury.  The pain seems to be worse at night when laying on that side she has a hard time finding a comfortable position.  She denies any neck pain or neurologic symptoms.  She would like to consider repeat injections today.    History reviewed. No pertinent past medical history.  Past Surgical History:   Procedure Laterality Date    TOTAL HIP ARTHROPLASTY Right 10/21/2024    Right posterior total hip replacement with Luis A robotic assistance,Aurelia insignia, 2 assistants, Sky NICOLE aware performed by Denis Marcus MD at [facility] OR     Social History     Socioeconomic History    Marital status:      Spouse name: Not on file    Number of children: Not on file    Years of education: Not on file    Highest education level: Not on file   Occupational History    Not on file   Tobacco Use    Smoking status: Never    Smokeless tobacco: Never   Substance and Sexual Activity    Alcohol use: Never    Drug use: Never    Sexual activity: Not on file   Other Topics Concern    Not on file   Social History Narrative    Not on file     Social Drivers of Health     Financial Resource Strain: Not on file   Food Insecurity: Not on file   Transportation Needs: Not on file   Physical Activity: Not on file   Stress: Not on file   Social Connections: Not on file   Intimate Partner Violence: Not on file   Housing Stability: Not on file     History reviewed. No pertinent family history.  Allergies   Allergen Reactions    Meperidine Nausea And Vomiting and Other (See Comments)

## 2025-07-23 NOTE — PROGRESS NOTES
Pati is status post right total hip replacement.  She reports that she has had increased right lower extremity radicular pain from her buttock down the extremity.  She has had known sciatica and chronic low back pain.  She has had prior lumbar discectomy and fusion with Dr. Barnhart at [facility].  She reports that he has retired recently.  I have seen her as she is status post right total hip replacement.  She reports that she has not really had any meaningful improvement in her symptoms after the hip replacement.  To me this would suggest that her pain is likely coming from her lumbar spine.  We discussed options which include possible referral to pain management or a spine surgeon to establish further care.  She would like to proceed with referral to pain management.  Referral has been placed.  I will plan to see her back for previously scheduled follow-up for the hip replacement.    Dr. Marcus   no...

## 2025-07-26 LAB
NON-UH HIE A/G RATIO: 1.4
NON-UH HIE ALB: 4.2 G/DL (ref 3.4–5)
NON-UH HIE ALK PHOS: 94 UNIT/L (ref 45–117)
NON-UH HIE BILIRUBIN, TOTAL: 0.3 MG/DL (ref 0.3–1.2)
NON-UH HIE BUN/CREAT RATIO: 21.1
NON-UH HIE BUN: 19 MG/DL (ref 9–23)
NON-UH HIE CALCIUM: 10 MG/DL (ref 8.7–10.4)
NON-UH HIE CALCULATED LDL CHOLESTEROL: 87 MG/DL (ref 60–130)
NON-UH HIE CALCULATED OSMOLALITY: 288 MOSM/KG (ref 275–295)
NON-UH HIE CHLORIDE: 106 MMOL/L (ref 98–107)
NON-UH HIE CHOLESTEROL: 170 MG/DL (ref 100–200)
NON-UH HIE CO2, VENOUS: 27 MMOL/L (ref 20–31)
NON-UH HIE CREATININE, URINE MG/DL: 214.5 MG/DL
NON-UH HIE CREATININE: 0.9 MG/DL (ref 0.5–0.8)
NON-UH HIE GFR AA: >60
NON-UH HIE GLOBULIN: 3 G/DL
NON-UH HIE GLOMERULAR FILTRATION RATE: >60 ML/MIN/1.73M?
NON-UH HIE GLUCOSE: 143 MG/DL (ref 74–106)
NON-UH HIE GOT: 17 UNIT/L (ref 15–37)
NON-UH HIE GPT: 16 UNIT/L (ref 10–49)
NON-UH HIE HDL CHOLESTEROL: 61 MG/DL (ref 40–60)
NON-UH HIE HGB A1C: 7.8 %
NON-UH HIE K: 4 MMOL/L (ref 3.5–5.1)
NON-UH HIE MICROALBUMIN, URINE MG/L: 56 MG/L
NON-UH HIE MICROALBUMIN/CREATININE RATIO: 26 MG MALB/GM CREAT (ref 0–30)
NON-UH HIE NA: 142 MMOL/L (ref 135–145)
NON-UH HIE TOTAL CHOL/HDL CHOL RATIO: 2.8
NON-UH HIE TOTAL PROTEIN: 7.2 G/DL (ref 5.7–8.2)
NON-UH HIE TRIGLYCERIDES: 108 MG/DL (ref 30–150)
NON-UH HIE TSH: 2.1 UIU/ML (ref 0.55–4.78)
NON-UH HIE VITAMIN B12: 515 PG/ML (ref 211–911)

## 2025-08-15 ENCOUNTER — TELEPHONE (OUTPATIENT)
Age: 60
End: 2025-08-15

## 2025-08-15 ENCOUNTER — HOSPITAL ENCOUNTER (OUTPATIENT)
Dept: CT IMAGING | Age: 60
Discharge: HOME | End: 2025-08-17
Attending: STUDENT IN AN ORGANIZED HEALTH CARE EDUCATION/TRAINING PROGRAM
Payer: MEDICARE

## 2025-08-15 ENCOUNTER — HOSPITAL ENCOUNTER (OUTPATIENT)
Age: 60
Discharge: HOME | End: 2025-08-17
Payer: MEDICARE

## 2025-08-15 ENCOUNTER — HOSPITAL ENCOUNTER (OUTPATIENT)
Dept: GENERAL RADIOLOGY | Age: 60
Discharge: HOME | End: 2025-08-17
Payer: MEDICARE

## 2025-08-15 ENCOUNTER — OFFICE VISIT (OUTPATIENT)
Dept: ORTHOPEDIC SURGERY | Age: 60
End: 2025-08-15

## 2025-08-15 VITALS
WEIGHT: 256 LBS | DIASTOLIC BLOOD PRESSURE: 80 MMHG | BODY MASS INDEX: 41.14 KG/M2 | HEIGHT: 66 IN | TEMPERATURE: 98.7 F | SYSTOLIC BLOOD PRESSURE: 138 MMHG | OXYGEN SATURATION: 98 %

## 2025-08-15 DIAGNOSIS — Z96.641 STATUS POST TOTAL HIP REPLACEMENT, RIGHT: Primary | ICD-10-CM

## 2025-08-15 DIAGNOSIS — M25.551 RIGHT HIP PAIN: ICD-10-CM

## 2025-08-15 DIAGNOSIS — Z96.641 STATUS POST TOTAL HIP REPLACEMENT, RIGHT: ICD-10-CM

## 2025-08-15 PROCEDURE — 73700 CT LOWER EXTREMITY W/O DYE: CPT

## 2025-08-15 PROCEDURE — 73502 X-RAY EXAM HIP UNI 2-3 VIEWS: CPT

## 2025-08-26 DIAGNOSIS — G43.719 INTRACTABLE CHRONIC MIGRAINE WITHOUT AURA AND WITHOUT STATUS MIGRAINOSUS: ICD-10-CM

## 2025-08-26 RX ORDER — ONDANSETRON 8 MG/1
8 TABLET, ORALLY DISINTEGRATING ORAL EVERY 8 HOURS PRN
Qty: 20 TABLET | Refills: 5 | Status: SHIPPED | OUTPATIENT
Start: 2025-08-26

## (undated) DEVICE — PILLOW POS W15XH6XL22IN RASPBERRY FOAM ABD W/ STRP DISP FOR

## (undated) DEVICE — SUTURE VICRYL + SZ 2-0 L18IN ABSRB UD CP-2 L26MM 1/2 CIR REV VCP762D

## (undated) DEVICE — NEEDLE SPNL 20GA L3 1 2IN YEL S STL POLYCARB HUB MTL STYL

## (undated) DEVICE — DRAPE,HIP,W/POUCHES,STERILE: Brand: MEDLINE

## (undated) DEVICE — Device: Brand: STABLECUT®

## (undated) DEVICE — DRESSING ANTIMIC FOAM MEPILEX BORD POSTOP AG PROD SZ 4X12 IN

## (undated) DEVICE — TOTAL HIP: Brand: MEDLINE INDUSTRIES, INC.

## (undated) DEVICE — ADHESIVE SKIN CLSR 0.7ML TOP DERMBND ADV

## (undated) DEVICE — DRAPE,U/ SHT,SPLIT,PLAS,STERIL: Brand: MEDLINE

## (undated) DEVICE — SUTURE STRATAFIX SZ 3-0 30CM NONABSORB UD 26MM FS 3/8 SXMP2B412

## (undated) DEVICE — GLOVE ORANGE PI 7 1/2   MSG9075

## (undated) DEVICE — ELECTRODE ES L275IN 275IN BLDE TIP COAT PTFE TEF W EVAC

## (undated) DEVICE — NEEDLE CIRCLE 1/2 IN SUTURE TAPER L1.950IN DIA0.056IN ABD S STL

## (undated) DEVICE — GLOVE ORTHO 7 1/2   MSG9475

## (undated) DEVICE — ELECTRODE PT RET AD L9FT HI MOIST COND ADH HYDRGEL CORDED

## (undated) DEVICE — HOOD: Brand: T7PLUS

## (undated) DEVICE — 4-PORT MANIFOLD: Brand: NEPTUNE 2

## (undated) DEVICE — APPLICATOR MEDICATED 26 CC SOLUTION HI LT ORNG CHLORAPREP

## (undated) DEVICE — SUTURE VICRYL + SZ 1 L27IN ABSRB UD CT-1 L36MM 1/2 CIR TAPR VCPP40D

## (undated) DEVICE — ADHESIVE SKIN CLOSURE WND 8.661X1.5 IN 22 CM LIQUIBAND SECUR

## (undated) DEVICE — SUTURE MONOCRYL SZ 3-0 L27IN ABSRB UD L19MM PS-2 3/8 CIR PRIM Y427H

## (undated) DEVICE — SYRINGE MED 50ML LUERLOCK TIP

## (undated) DEVICE — SUTURE ABSORBABLE ANTIBACT 1-0 CT-1 24 IN STRATAFIX PDS + SXPP1A443

## (undated) DEVICE — SOLUTION WND IRRIGATION 450 ML 0.5 PVP-I 0.9 NACL

## (undated) DEVICE — FAN SPRAY KIT: Brand: PULSAVAC®